# Patient Record
Sex: MALE | Race: BLACK OR AFRICAN AMERICAN | NOT HISPANIC OR LATINO | Employment: OTHER | URBAN - METROPOLITAN AREA
[De-identification: names, ages, dates, MRNs, and addresses within clinical notes are randomized per-mention and may not be internally consistent; named-entity substitution may affect disease eponyms.]

---

## 2023-04-26 LAB
COBALAMIN (VITAMIN B12) (PG/ML) IN SER/PLAS: 322 PG/ML (ref 211–911)
FOLATE (NG/ML) IN SER/PLAS: 9.5 NG/ML
HEPATITIS B VIRUS SURFACE AB (MIU/ML) IN SERUM: 318.3 MIU/ML
HEPATITIS B VIRUS SURFACE AG PRESENCE IN SERUM: NONREACTIVE
IRON (UG/DL) IN SER/PLAS: 104 UG/DL (ref 35–150)
IRON BINDING CAPACITY (UG/DL) IN SER/PLAS: 409 UG/DL (ref 240–445)
IRON SATURATION (%) IN SER/PLAS: 25 % (ref 25–45)
THYROTROPIN (MIU/L) IN SER/PLAS BY DETECTION LIMIT <= 0.05 MIU/L: 0.99 MIU/L (ref 0.44–3.98)

## 2023-04-27 LAB
FERRITIN (UG/LL) IN SER/PLAS: 51 UG/L (ref 20–300)
HEPATITIS B VIRUS CORE AB (PRESENCE) IN SER/PLAS BY IMM: NONREACTIVE
HEPATITIS B VIRUS CORE IGM AB PRESENCE IN SER/PLAS BY IMMUNOASSY: NONREACTIVE
HEPATITIS C VIRUS AB PRESENCE IN SERUM: NONREACTIVE

## 2023-05-10 ENCOUNTER — HOSPITAL ENCOUNTER (OUTPATIENT)
Dept: DATA CONVERSION | Facility: HOSPITAL | Age: 63
End: 2023-05-10
Attending: STUDENT IN AN ORGANIZED HEALTH CARE EDUCATION/TRAINING PROGRAM | Admitting: STUDENT IN AN ORGANIZED HEALTH CARE EDUCATION/TRAINING PROGRAM
Payer: COMMERCIAL

## 2023-05-10 DIAGNOSIS — Z12.11 ENCOUNTER FOR SCREENING FOR MALIGNANT NEOPLASM OF COLON: ICD-10-CM

## 2023-05-10 DIAGNOSIS — Z79.84 LONG TERM (CURRENT) USE OF ORAL HYPOGLYCEMIC DRUGS: ICD-10-CM

## 2023-05-10 DIAGNOSIS — K64.4 RESIDUAL HEMORRHOIDAL SKIN TAGS: ICD-10-CM

## 2023-05-10 DIAGNOSIS — B37.0 CANDIDAL STOMATITIS: ICD-10-CM

## 2023-05-10 DIAGNOSIS — K63.5 POLYP OF COLON: ICD-10-CM

## 2023-05-10 DIAGNOSIS — K40.30 UNILATERAL INGUINAL HERNIA, WITH OBSTRUCTION, WITHOUT GANGRENE, NOT SPECIFIED AS RECURRENT: ICD-10-CM

## 2023-05-10 DIAGNOSIS — Z79.82 LONG TERM (CURRENT) USE OF ASPIRIN: ICD-10-CM

## 2023-05-10 DIAGNOSIS — I42.8 OTHER CARDIOMYOPATHIES (MULTI): ICD-10-CM

## 2023-05-10 DIAGNOSIS — I25.10 ATHEROSCLEROTIC HEART DISEASE OF NATIVE CORONARY ARTERY WITHOUT ANGINA PECTORIS: ICD-10-CM

## 2023-05-10 DIAGNOSIS — Q39.8 OTHER CONGENITAL MALFORMATIONS OF ESOPHAGUS: ICD-10-CM

## 2023-05-10 DIAGNOSIS — K62.1 RECTAL POLYP: ICD-10-CM

## 2023-05-10 DIAGNOSIS — K29.70 GASTRITIS, UNSPECIFIED, WITHOUT BLEEDING: ICD-10-CM

## 2023-05-10 DIAGNOSIS — Q43.8 OTHER SPECIFIED CONGENITAL MALFORMATIONS OF INTESTINE: ICD-10-CM

## 2023-05-10 DIAGNOSIS — K57.30 DIVERTICULOSIS OF LARGE INTESTINE WITHOUT PERFORATION OR ABSCESS WITHOUT BLEEDING: ICD-10-CM

## 2023-05-10 DIAGNOSIS — K64.8 OTHER HEMORRHOIDS: ICD-10-CM

## 2023-05-10 DIAGNOSIS — I25.2 OLD MYOCARDIAL INFARCTION: ICD-10-CM

## 2023-05-10 DIAGNOSIS — E11.9 TYPE 2 DIABETES MELLITUS WITHOUT COMPLICATIONS (MULTI): ICD-10-CM

## 2023-05-10 DIAGNOSIS — K44.9 DIAPHRAGMATIC HERNIA WITHOUT OBSTRUCTION OR GANGRENE: ICD-10-CM

## 2023-05-10 DIAGNOSIS — F20.9 SCHIZOPHRENIA, UNSPECIFIED (MULTI): ICD-10-CM

## 2023-05-10 DIAGNOSIS — R77.8 OTHER SPECIFIED ABNORMALITIES OF PLASMA PROTEINS: ICD-10-CM

## 2023-05-10 DIAGNOSIS — R12 HEARTBURN: ICD-10-CM

## 2023-05-10 DIAGNOSIS — J44.9 CHRONIC OBSTRUCTIVE PULMONARY DISEASE, UNSPECIFIED (MULTI): ICD-10-CM

## 2023-05-10 LAB
POCT GLUCOSE: 109 MG/DL (ref 74–99)
POCT GLUCOSE: 94 MG/DL (ref 74–99)

## 2023-05-17 LAB
COMPLETE PATHOLOGY REPORT: NORMAL
CONVERTED CLINICAL DIAGNOSIS-HISTORY: NORMAL
CONVERTED FINAL DIAGNOSIS: NORMAL
CONVERTED FINAL REPORT PDF LINK TO COPY AND PASTE: NORMAL
CONVERTED GROSS DESCRIPTION: NORMAL

## 2023-06-20 LAB — HIV 1/ 2 AG/AB SCREEN: NONREACTIVE

## 2023-09-07 VITALS — HEIGHT: 68 IN | WEIGHT: 182.98 LBS | BODY MASS INDEX: 27.73 KG/M2

## 2023-11-10 DIAGNOSIS — B37.0 THRUSH: Primary | ICD-10-CM

## 2023-11-11 RX ORDER — NYSTATIN 100000 [USP'U]/ML
SUSPENSION ORAL
Qty: 200 ML | Refills: 10 | Status: SHIPPED | OUTPATIENT
Start: 2023-11-11

## 2023-12-29 VITALS
TEMPERATURE: 97.7 F | BODY MASS INDEX: 27.28 KG/M2 | HEIGHT: 68 IN | SYSTOLIC BLOOD PRESSURE: 166 MMHG | RESPIRATION RATE: 20 BRPM | OXYGEN SATURATION: 93 % | HEART RATE: 111 BPM | DIASTOLIC BLOOD PRESSURE: 110 MMHG | WEIGHT: 180 LBS

## 2023-12-29 PROCEDURE — 99283 EMERGENCY DEPT VISIT LOW MDM: CPT | Performed by: EMERGENCY MEDICINE

## 2023-12-29 ASSESSMENT — COLUMBIA-SUICIDE SEVERITY RATING SCALE - C-SSRS
6. HAVE YOU EVER DONE ANYTHING, STARTED TO DO ANYTHING, OR PREPARED TO DO ANYTHING TO END YOUR LIFE?: NO
2. HAVE YOU ACTUALLY HAD ANY THOUGHTS OF KILLING YOURSELF?: NO
1. IN THE PAST MONTH, HAVE YOU WISHED YOU WERE DEAD OR WISHED YOU COULD GO TO SLEEP AND NOT WAKE UP?: NO

## 2023-12-30 ENCOUNTER — HOSPITAL ENCOUNTER (EMERGENCY)
Facility: HOSPITAL | Age: 63
Discharge: HOME | End: 2023-12-30
Attending: EMERGENCY MEDICINE
Payer: COMMERCIAL

## 2023-12-30 DIAGNOSIS — G89.29 CHRONIC BILATERAL LOW BACK PAIN WITHOUT SCIATICA: Primary | ICD-10-CM

## 2023-12-30 DIAGNOSIS — M54.50 CHRONIC BILATERAL LOW BACK PAIN WITHOUT SCIATICA: Primary | ICD-10-CM

## 2023-12-30 PROCEDURE — 2500000001 HC RX 250 WO HCPCS SELF ADMINISTERED DRUGS (ALT 637 FOR MEDICARE OP): Performed by: EMERGENCY MEDICINE

## 2023-12-30 RX ORDER — IBUPROFEN 600 MG/1
600 TABLET ORAL EVERY 6 HOURS PRN
Qty: 28 TABLET | Refills: 0 | Status: SHIPPED | OUTPATIENT
Start: 2023-12-30 | End: 2024-01-06

## 2023-12-30 RX ORDER — CYCLOBENZAPRINE HCL 10 MG
10 TABLET ORAL 2 TIMES DAILY PRN
Qty: 10 TABLET | Refills: 0 | Status: SHIPPED | OUTPATIENT
Start: 2023-12-30 | End: 2024-05-17 | Stop reason: HOSPADM

## 2023-12-30 RX ORDER — IBUPROFEN 600 MG/1
600 TABLET ORAL ONCE
Status: COMPLETED | OUTPATIENT
Start: 2023-12-30 | End: 2023-12-30

## 2023-12-30 RX ADMIN — IBUPROFEN 600 MG: 600 TABLET, FILM COATED ORAL at 04:07
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
## 2024-03-20 DIAGNOSIS — R12 HEARTBURN: Primary | ICD-10-CM

## 2024-03-21 RX ORDER — OMEPRAZOLE 40 MG/1
40 CAPSULE, DELAYED RELEASE ORAL DAILY
Qty: 30 CAPSULE | Refills: 10 | Status: SHIPPED | OUTPATIENT
Start: 2024-03-21

## 2024-04-18 DIAGNOSIS — Z12.11 COLON CANCER SCREENING: Primary | ICD-10-CM

## 2024-04-18 RX ORDER — POLYETHYLENE GLYCOL 3350 17 G/17G
POWDER, FOR SOLUTION ORAL
Qty: 510 G | Refills: 10 | Status: SHIPPED | OUTPATIENT
Start: 2024-04-18

## 2024-05-14 ENCOUNTER — APPOINTMENT (OUTPATIENT)
Dept: CARDIOLOGY | Facility: HOSPITAL | Age: 64
DRG: 193 | End: 2024-05-14
Payer: COMMERCIAL

## 2024-05-14 ENCOUNTER — APPOINTMENT (OUTPATIENT)
Dept: RADIOLOGY | Facility: HOSPITAL | Age: 64
DRG: 193 | End: 2024-05-14
Payer: COMMERCIAL

## 2024-05-14 ENCOUNTER — HOSPITAL ENCOUNTER (INPATIENT)
Facility: HOSPITAL | Age: 64
LOS: 3 days | Discharge: HOME | DRG: 193 | End: 2024-05-17
Attending: EMERGENCY MEDICINE | Admitting: STUDENT IN AN ORGANIZED HEALTH CARE EDUCATION/TRAINING PROGRAM
Payer: COMMERCIAL

## 2024-05-14 DIAGNOSIS — R79.89 OTHER SPECIFIED ABNORMAL FINDINGS OF BLOOD CHEMISTRY: ICD-10-CM

## 2024-05-14 DIAGNOSIS — J44.1 COPD EXACERBATION (MULTI): Primary | ICD-10-CM

## 2024-05-14 DIAGNOSIS — J18.9 PNEUMONIA OF RIGHT LOWER LOBE DUE TO INFECTIOUS ORGANISM: ICD-10-CM

## 2024-05-14 DIAGNOSIS — I50.20 UNSPECIFIED SYSTOLIC (CONGESTIVE) HEART FAILURE (MULTI): ICD-10-CM

## 2024-05-14 DIAGNOSIS — J96.02 ACUTE RESPIRATORY FAILURE WITH HYPOXIA AND HYPERCAPNIA (MULTI): ICD-10-CM

## 2024-05-14 DIAGNOSIS — I50.9 HEART FAILURE, UNSPECIFIED HF CHRONICITY, UNSPECIFIED HEART FAILURE TYPE (MULTI): ICD-10-CM

## 2024-05-14 DIAGNOSIS — J96.01 ACUTE RESPIRATORY FAILURE WITH HYPOXIA AND HYPERCAPNIA (MULTI): ICD-10-CM

## 2024-05-14 LAB
ALBUMIN SERPL BCP-MCNC: 4.5 G/DL (ref 3.4–5)
ALP SERPL-CCNC: 53 U/L (ref 33–136)
ALT SERPL W P-5'-P-CCNC: 12 U/L (ref 10–52)
ANION GAP BLDV CALCULATED.4IONS-SCNC: 10 MMOL/L (ref 10–25)
ANION GAP BLDV CALCULATED.4IONS-SCNC: 13 MMOL/L (ref 10–25)
ANION GAP SERPL CALC-SCNC: 14 MMOL/L (ref 10–20)
AST SERPL W P-5'-P-CCNC: 13 U/L (ref 9–39)
BASE EXCESS BLDV CALC-SCNC: -0.1 MMOL/L (ref -2–3)
BASE EXCESS BLDV CALC-SCNC: -1.2 MMOL/L (ref -2–3)
BASOPHILS # BLD AUTO: 0.08 X10*3/UL (ref 0–0.1)
BASOPHILS NFR BLD AUTO: 0.6 %
BILIRUB SERPL-MCNC: 0.4 MG/DL (ref 0–1.2)
BNP SERPL-MCNC: 452 PG/ML (ref 0–99)
BODY TEMPERATURE: 37 DEGREES CELSIUS
BODY TEMPERATURE: 37 DEGREES CELSIUS
BUN SERPL-MCNC: 9 MG/DL (ref 6–23)
CA-I BLDV-SCNC: 1.18 MMOL/L (ref 1.1–1.33)
CA-I BLDV-SCNC: 1.29 MMOL/L (ref 1.1–1.33)
CALCIUM SERPL-MCNC: 9.5 MG/DL (ref 8.6–10.3)
CARDIAC TROPONIN I PNL SERPL HS: 49 NG/L (ref 0–20)
CARDIAC TROPONIN I PNL SERPL HS: 55 NG/L (ref 0–20)
CHLORIDE BLDV-SCNC: 94 MMOL/L (ref 98–107)
CHLORIDE BLDV-SCNC: 94 MMOL/L (ref 98–107)
CHLORIDE SERPL-SCNC: 95 MMOL/L (ref 98–107)
CO2 SERPL-SCNC: 26 MMOL/L (ref 21–32)
CREAT SERPL-MCNC: 0.83 MG/DL (ref 0.5–1.3)
CRITICAL CALL TIME: 112
CRITICAL CALLED BY: ABNORMAL
CRITICAL CALLED TO: ABNORMAL
CRITICAL READ BACK: ABNORMAL
EGFRCR SERPLBLD CKD-EPI 2021: >90 ML/MIN/1.73M*2
EOSINOPHIL # BLD AUTO: 0.57 X10*3/UL (ref 0–0.7)
EOSINOPHIL NFR BLD AUTO: 4.1 %
EPAP CMH2O: 8 CM H2O
ERYTHROCYTE [DISTWIDTH] IN BLOOD BY AUTOMATED COUNT: 15.6 % (ref 11.5–14.5)
FREQUENCY (BPM): 20 BPM
GLUCOSE BLD MANUAL STRIP-MCNC: 135 MG/DL (ref 74–99)
GLUCOSE BLD MANUAL STRIP-MCNC: 140 MG/DL (ref 74–99)
GLUCOSE BLD MANUAL STRIP-MCNC: 156 MG/DL (ref 74–99)
GLUCOSE BLD MANUAL STRIP-MCNC: 161 MG/DL (ref 74–99)
GLUCOSE BLDV-MCNC: 256 MG/DL (ref 74–99)
GLUCOSE BLDV-MCNC: 287 MG/DL (ref 74–99)
GLUCOSE SERPL-MCNC: 257 MG/DL (ref 74–99)
HCO3 BLDV-SCNC: 26.3 MMOL/L (ref 22–26)
HCO3 BLDV-SCNC: 30.6 MMOL/L (ref 22–26)
HCT VFR BLD AUTO: 42.8 % (ref 41–52)
HCT VFR BLD EST: 35 % (ref 41–52)
HCT VFR BLD EST: 41 % (ref 41–52)
HGB BLD-MCNC: 13.7 G/DL (ref 13.5–17.5)
HGB BLDV-MCNC: 11.8 G/DL (ref 13.5–17.5)
HGB BLDV-MCNC: 13.6 G/DL (ref 13.5–17.5)
IMM GRANULOCYTES # BLD AUTO: 0.06 X10*3/UL (ref 0–0.7)
IMM GRANULOCYTES NFR BLD AUTO: 0.4 % (ref 0–0.9)
INHALED O2 CONCENTRATION: 44 %
INHALED O2 CONCENTRATION: 50 %
IPAP CMH2O: 20 CM H2O
LACTATE BLDV-SCNC: 2.5 MMOL/L (ref 0.4–2)
LACTATE BLDV-SCNC: 3.1 MMOL/L (ref 0.4–2)
LYMPHOCYTES # BLD AUTO: 4.59 X10*3/UL (ref 1.2–4.8)
LYMPHOCYTES NFR BLD AUTO: 32.9 %
MCH RBC QN AUTO: 31.6 PG (ref 26–34)
MCHC RBC AUTO-ENTMCNC: 32 G/DL (ref 32–36)
MCV RBC AUTO: 99 FL (ref 80–100)
MONOCYTES # BLD AUTO: 1.26 X10*3/UL (ref 0.1–1)
MONOCYTES NFR BLD AUTO: 9 %
NEUTROPHILS # BLD AUTO: 7.39 X10*3/UL (ref 1.2–7.7)
NEUTROPHILS NFR BLD AUTO: 53 %
NRBC BLD-RTO: 0 /100 WBCS (ref 0–0)
OXYHGB MFR BLDV: 63.7 % (ref 45–75)
OXYHGB MFR BLDV: 77.6 % (ref 45–75)
PCO2 BLDV: 56 MM HG (ref 41–51)
PCO2 BLDV: 82 MM HG (ref 41–51)
PH BLDV: 7.18 PH (ref 7.33–7.43)
PH BLDV: 7.28 PH (ref 7.33–7.43)
PLATELET # BLD AUTO: 221 X10*3/UL (ref 150–450)
PO2 BLDV: 43 MM HG (ref 35–45)
PO2 BLDV: 54 MM HG (ref 35–45)
POTASSIUM BLDV-SCNC: 4.8 MMOL/L (ref 3.5–5.3)
POTASSIUM BLDV-SCNC: 5 MMOL/L (ref 3.5–5.3)
POTASSIUM SERPL-SCNC: 4.6 MMOL/L (ref 3.5–5.3)
PROCALCITONIN SERPL-MCNC: 0.06 NG/ML
PROT SERPL-MCNC: 7.8 G/DL (ref 6.4–8.2)
RBC # BLD AUTO: 4.33 X10*6/UL (ref 4.5–5.9)
SAO2 % BLDV: 67 % (ref 45–75)
SAO2 % BLDV: 81 % (ref 45–75)
SARS-COV-2 RNA RESP QL NAA+PROBE: NOT DETECTED
SODIUM BLDV-SCNC: 128 MMOL/L (ref 136–145)
SODIUM BLDV-SCNC: 130 MMOL/L (ref 136–145)
SODIUM SERPL-SCNC: 130 MMOL/L (ref 136–145)
SPONTANEOUS TIDAL VOLUME: 818 ML
TOTAL MINUTE VOLUME: 17.1 LITER
VENTILATOR MODE: ABNORMAL
VENTILATOR RATE: 14 BPM
WBC # BLD AUTO: 14 X10*3/UL (ref 4.4–11.3)

## 2024-05-14 PROCEDURE — 2500000004 HC RX 250 GENERAL PHARMACY W/ HCPCS (ALT 636 FOR OP/ED)

## 2024-05-14 PROCEDURE — 94640 AIRWAY INHALATION TREATMENT: CPT

## 2024-05-14 PROCEDURE — 82947 ASSAY GLUCOSE BLOOD QUANT: CPT

## 2024-05-14 PROCEDURE — 71045 X-RAY EXAM CHEST 1 VIEW: CPT

## 2024-05-14 PROCEDURE — 71275 CT ANGIOGRAPHY CHEST: CPT | Performed by: RADIOLOGY

## 2024-05-14 PROCEDURE — 2500000002 HC RX 250 W HCPCS SELF ADMINISTERED DRUGS (ALT 637 FOR MEDICARE OP, ALT 636 FOR OP/ED)

## 2024-05-14 PROCEDURE — 84132 ASSAY OF SERUM POTASSIUM: CPT | Mod: 91 | Performed by: EMERGENCY MEDICINE

## 2024-05-14 PROCEDURE — 84484 ASSAY OF TROPONIN QUANT: CPT | Performed by: EMERGENCY MEDICINE

## 2024-05-14 PROCEDURE — 2500000001 HC RX 250 WO HCPCS SELF ADMINISTERED DRUGS (ALT 637 FOR MEDICARE OP)

## 2024-05-14 PROCEDURE — 87899 AGENT NOS ASSAY W/OPTIC: CPT | Mod: PARLAB

## 2024-05-14 PROCEDURE — 96365 THER/PROPH/DIAG IV INF INIT: CPT

## 2024-05-14 PROCEDURE — 2500000005 HC RX 250 GENERAL PHARMACY W/O HCPCS: Performed by: EMERGENCY MEDICINE

## 2024-05-14 PROCEDURE — 93005 ELECTROCARDIOGRAM TRACING: CPT

## 2024-05-14 PROCEDURE — 36415 COLL VENOUS BLD VENIPUNCTURE: CPT | Performed by: EMERGENCY MEDICINE

## 2024-05-14 PROCEDURE — 87449 NOS EACH ORGANISM AG IA: CPT | Mod: PARLAB

## 2024-05-14 PROCEDURE — 5A09357 ASSISTANCE WITH RESPIRATORY VENTILATION, LESS THAN 24 CONSECUTIVE HOURS, CONTINUOUS POSITIVE AIRWAY PRESSURE: ICD-10-PCS | Performed by: EMERGENCY MEDICINE

## 2024-05-14 PROCEDURE — C9113 INJ PANTOPRAZOLE SODIUM, VIA: HCPCS

## 2024-05-14 PROCEDURE — 99223 1ST HOSP IP/OBS HIGH 75: CPT

## 2024-05-14 PROCEDURE — 83880 ASSAY OF NATRIURETIC PEPTIDE: CPT | Performed by: EMERGENCY MEDICINE

## 2024-05-14 PROCEDURE — 87040 BLOOD CULTURE FOR BACTERIA: CPT | Mod: PARLAB | Performed by: EMERGENCY MEDICINE

## 2024-05-14 PROCEDURE — 85025 COMPLETE CBC W/AUTO DIFF WBC: CPT | Performed by: EMERGENCY MEDICINE

## 2024-05-14 PROCEDURE — 87081 CULTURE SCREEN ONLY: CPT | Mod: PARLAB

## 2024-05-14 PROCEDURE — 2500000002 HC RX 250 W HCPCS SELF ADMINISTERED DRUGS (ALT 637 FOR MEDICARE OP, ALT 636 FOR OP/ED): Mod: MUE | Performed by: STUDENT IN AN ORGANIZED HEALTH CARE EDUCATION/TRAINING PROGRAM

## 2024-05-14 PROCEDURE — 84145 PROCALCITONIN (PCT): CPT | Mod: PARLAB

## 2024-05-14 PROCEDURE — 94660 CPAP INITIATION&MGMT: CPT

## 2024-05-14 PROCEDURE — 86738 MYCOPLASMA ANTIBODY: CPT

## 2024-05-14 PROCEDURE — 2500000004 HC RX 250 GENERAL PHARMACY W/ HCPCS (ALT 636 FOR OP/ED): Performed by: EMERGENCY MEDICINE

## 2024-05-14 PROCEDURE — 99291 CRITICAL CARE FIRST HOUR: CPT | Mod: 25 | Performed by: EMERGENCY MEDICINE

## 2024-05-14 PROCEDURE — 2500000002 HC RX 250 W HCPCS SELF ADMINISTERED DRUGS (ALT 637 FOR MEDICARE OP, ALT 636 FOR OP/ED): Performed by: EMERGENCY MEDICINE

## 2024-05-14 PROCEDURE — 87635 SARS-COV-2 COVID-19 AMP PRB: CPT | Performed by: EMERGENCY MEDICINE

## 2024-05-14 PROCEDURE — 2550000001 HC RX 255 CONTRASTS: Performed by: STUDENT IN AN ORGANIZED HEALTH CARE EDUCATION/TRAINING PROGRAM

## 2024-05-14 PROCEDURE — 71045 X-RAY EXAM CHEST 1 VIEW: CPT | Mod: FOREIGN READ | Performed by: RADIOLOGY

## 2024-05-14 PROCEDURE — 2500000004 HC RX 250 GENERAL PHARMACY W/ HCPCS (ALT 636 FOR OP/ED): Performed by: STUDENT IN AN ORGANIZED HEALTH CARE EDUCATION/TRAINING PROGRAM

## 2024-05-14 PROCEDURE — 71275 CT ANGIOGRAPHY CHEST: CPT

## 2024-05-14 PROCEDURE — 2060000001 HC INTERMEDIATE ICU ROOM DAILY

## 2024-05-14 RX ORDER — ASPIRIN 325 MG
50000 TABLET, DELAYED RELEASE (ENTERIC COATED) ORAL
Status: DISCONTINUED | OUTPATIENT
Start: 2024-05-19 | End: 2024-05-14

## 2024-05-14 RX ORDER — IPRATROPIUM BROMIDE AND ALBUTEROL SULFATE 2.5; .5 MG/3ML; MG/3ML
3 SOLUTION RESPIRATORY (INHALATION) ONCE
Status: COMPLETED | OUTPATIENT
Start: 2024-05-14 | End: 2024-05-14

## 2024-05-14 RX ORDER — CEFTRIAXONE 1 G/50ML
1 INJECTION, SOLUTION INTRAVENOUS EVERY 24 HOURS
Status: DISCONTINUED | OUTPATIENT
Start: 2024-05-15 | End: 2024-05-14

## 2024-05-14 RX ORDER — INSULIN LISPRO 100 [IU]/ML
0-10 INJECTION, SOLUTION INTRAVENOUS; SUBCUTANEOUS
Status: DISCONTINUED | OUTPATIENT
Start: 2024-05-14 | End: 2024-05-17 | Stop reason: HOSPADM

## 2024-05-14 RX ORDER — IPRATROPIUM BROMIDE AND ALBUTEROL SULFATE 2.5; .5 MG/3ML; MG/3ML
SOLUTION RESPIRATORY (INHALATION)
Status: COMPLETED
Start: 2024-05-14 | End: 2024-05-14

## 2024-05-14 RX ORDER — BENZTROPINE MESYLATE 0.5 MG/1
0.5 TABLET ORAL 2 TIMES DAILY
Status: DISCONTINUED | OUTPATIENT
Start: 2024-05-14 | End: 2024-05-17 | Stop reason: HOSPADM

## 2024-05-14 RX ORDER — METFORMIN HYDROCHLORIDE 500 MG/1
500 TABLET ORAL 2 TIMES DAILY
COMMUNITY

## 2024-05-14 RX ORDER — FERROUS SULFATE 325(65) MG
325 TABLET ORAL EVERY OTHER DAY
COMMUNITY

## 2024-05-14 RX ORDER — DOCUSATE SODIUM 100 MG/1
1 CAPSULE, LIQUID FILLED ORAL 2 TIMES DAILY
COMMUNITY

## 2024-05-14 RX ORDER — CARVEDILOL 25 MG/1
25 TABLET ORAL 2 TIMES DAILY
Status: DISCONTINUED | OUTPATIENT
Start: 2024-05-14 | End: 2024-05-17 | Stop reason: HOSPADM

## 2024-05-14 RX ORDER — ENOXAPARIN SODIUM 100 MG/ML
40 INJECTION SUBCUTANEOUS EVERY 24 HOURS
Status: DISCONTINUED | OUTPATIENT
Start: 2024-05-14 | End: 2024-05-17 | Stop reason: HOSPADM

## 2024-05-14 RX ORDER — FLUTICASONE PROPIONATE 50 MCG
1 SPRAY, SUSPENSION (ML) NASAL DAILY PRN
COMMUNITY

## 2024-05-14 RX ORDER — CARVEDILOL 25 MG/1
1 TABLET ORAL 2 TIMES DAILY
COMMUNITY

## 2024-05-14 RX ORDER — FUROSEMIDE 10 MG/ML
40 INJECTION INTRAMUSCULAR; INTRAVENOUS
Status: COMPLETED | OUTPATIENT
Start: 2024-05-15 | End: 2024-05-15

## 2024-05-14 RX ORDER — ACETAZOLAMIDE 500 MG/5ML
500 INJECTION, POWDER, LYOPHILIZED, FOR SOLUTION INTRAVENOUS ONCE
Status: COMPLETED | OUTPATIENT
Start: 2024-05-15 | End: 2024-05-15

## 2024-05-14 RX ORDER — AMMONIUM LACTATE 12 G/100G
1 LOTION TOPICAL 2 TIMES DAILY PRN
COMMUNITY

## 2024-05-14 RX ORDER — SPIRONOLACTONE 25 MG/1
25 TABLET ORAL 2 TIMES DAILY
Status: DISCONTINUED | OUTPATIENT
Start: 2024-05-14 | End: 2024-05-15

## 2024-05-14 RX ORDER — POLYETHYLENE GLYCOL 3350 17 G/17G
17 POWDER, FOR SOLUTION ORAL DAILY
Status: DISCONTINUED | OUTPATIENT
Start: 2024-05-14 | End: 2024-05-17 | Stop reason: HOSPADM

## 2024-05-14 RX ORDER — ALBUTEROL SULFATE 0.83 MG/ML
2.5 SOLUTION RESPIRATORY (INHALATION) EVERY 4 HOURS PRN
Status: DISCONTINUED | OUTPATIENT
Start: 2024-05-14 | End: 2024-05-17 | Stop reason: HOSPADM

## 2024-05-14 RX ORDER — LANOLIN ALCOHOL/MO/W.PET/CERES
1000 CREAM (GRAM) TOPICAL DAILY
Status: DISCONTINUED | OUTPATIENT
Start: 2024-05-14 | End: 2024-05-14

## 2024-05-14 RX ORDER — PANTOPRAZOLE SODIUM 40 MG/10ML
40 INJECTION, POWDER, LYOPHILIZED, FOR SOLUTION INTRAVENOUS DAILY
Status: DISCONTINUED | OUTPATIENT
Start: 2024-05-14 | End: 2024-05-17 | Stop reason: HOSPADM

## 2024-05-14 RX ORDER — IPRATROPIUM BROMIDE AND ALBUTEROL SULFATE 2.5; .5 MG/3ML; MG/3ML
3 SOLUTION RESPIRATORY (INHALATION) EVERY 2 HOUR PRN
Status: DISCONTINUED | OUTPATIENT
Start: 2024-05-14 | End: 2024-05-14

## 2024-05-14 RX ORDER — BENZTROPINE MESYLATE 0.5 MG/1
0.5 TABLET ORAL 2 TIMES DAILY
COMMUNITY

## 2024-05-14 RX ORDER — ASPIRIN 81 MG/1
81 TABLET ORAL DAILY
COMMUNITY

## 2024-05-14 RX ORDER — LANOLIN ALCOHOL/MO/W.PET/CERES
1000 CREAM (GRAM) TOPICAL DAILY
COMMUNITY

## 2024-05-14 RX ORDER — CETIRIZINE HYDROCHLORIDE 10 MG/1
1 TABLET ORAL DAILY
COMMUNITY

## 2024-05-14 RX ORDER — HYDROCORTISONE 25 MG/ML
1 LOTION TOPICAL 2 TIMES DAILY PRN
COMMUNITY

## 2024-05-14 RX ORDER — ATORVASTATIN CALCIUM 40 MG/1
1 TABLET, FILM COATED ORAL DAILY
COMMUNITY

## 2024-05-14 RX ORDER — DEXTROSE 50 % IN WATER (D50W) INTRAVENOUS SYRINGE
25
Status: DISCONTINUED | OUTPATIENT
Start: 2024-05-14 | End: 2024-05-17 | Stop reason: HOSPADM

## 2024-05-14 RX ORDER — GUAIFENESIN 600 MG/1
600 TABLET, EXTENDED RELEASE ORAL 2 TIMES DAILY PRN
Status: DISCONTINUED | OUTPATIENT
Start: 2024-05-14 | End: 2024-05-17 | Stop reason: HOSPADM

## 2024-05-14 RX ORDER — AMLODIPINE BESYLATE 10 MG/1
10 TABLET ORAL DAILY
COMMUNITY

## 2024-05-14 RX ORDER — CEFTRIAXONE 2 G/50ML
2 INJECTION, SOLUTION INTRAVENOUS ONCE
Status: COMPLETED | OUTPATIENT
Start: 2024-05-14 | End: 2024-05-14

## 2024-05-14 RX ORDER — FUROSEMIDE 10 MG/ML
40 INJECTION INTRAMUSCULAR; INTRAVENOUS ONCE
Status: COMPLETED | OUTPATIENT
Start: 2024-05-14 | End: 2024-05-14

## 2024-05-14 RX ORDER — ATORVASTATIN CALCIUM 40 MG/1
40 TABLET, FILM COATED ORAL DAILY
Status: DISCONTINUED | OUTPATIENT
Start: 2024-05-14 | End: 2024-05-17 | Stop reason: HOSPADM

## 2024-05-14 RX ORDER — ASPIRIN 325 MG
1 TABLET, DELAYED RELEASE (ENTERIC COATED) ORAL
COMMUNITY

## 2024-05-14 RX ORDER — IPRATROPIUM BROMIDE AND ALBUTEROL SULFATE 2.5; .5 MG/3ML; MG/3ML
3 SOLUTION RESPIRATORY (INHALATION)
Status: DISCONTINUED | OUTPATIENT
Start: 2024-05-14 | End: 2024-05-14

## 2024-05-14 RX ORDER — SPIRONOLACTONE 25 MG/1
25 TABLET ORAL 2 TIMES DAILY
COMMUNITY

## 2024-05-14 RX ORDER — DEXTROSE 50 % IN WATER (D50W) INTRAVENOUS SYRINGE
12.5
Status: DISCONTINUED | OUTPATIENT
Start: 2024-05-14 | End: 2024-05-17 | Stop reason: HOSPADM

## 2024-05-14 RX ORDER — AMLODIPINE BESYLATE 10 MG/1
10 TABLET ORAL DAILY
Status: DISCONTINUED | OUTPATIENT
Start: 2024-05-14 | End: 2024-05-16

## 2024-05-14 RX ORDER — TRIAMCINOLONE ACETONIDE 1 MG/G
1 OINTMENT TOPICAL 2 TIMES DAILY PRN
COMMUNITY

## 2024-05-14 RX ORDER — ASPIRIN 81 MG/1
81 TABLET ORAL DAILY
Status: DISCONTINUED | OUTPATIENT
Start: 2024-05-14 | End: 2024-05-17 | Stop reason: HOSPADM

## 2024-05-14 RX ADMIN — IPRATROPIUM BROMIDE AND ALBUTEROL SULFATE 3 ML: .5; 3 SOLUTION RESPIRATORY (INHALATION) at 10:34

## 2024-05-14 RX ADMIN — BENZTROPINE MESYLATE 0.5 MG: 0.5 TABLET ORAL at 22:12

## 2024-05-14 RX ADMIN — IPRATROPIUM BROMIDE AND ALBUTEROL SULFATE 3 ML: .5; 3 SOLUTION RESPIRATORY (INHALATION) at 14:05

## 2024-05-14 RX ADMIN — ASPIRIN 81 MG: 81 TABLET, COATED ORAL at 10:39

## 2024-05-14 RX ADMIN — ATORVASTATIN CALCIUM 40 MG: 40 TABLET, FILM COATED ORAL at 10:39

## 2024-05-14 RX ADMIN — IPRATROPIUM BROMIDE AND ALBUTEROL SULFATE 3 ML: 2.5; .5 SOLUTION RESPIRATORY (INHALATION) at 10:34

## 2024-05-14 RX ADMIN — ACETAZOLAMIDE 500 MG: 500 INJECTION, POWDER, LYOPHILIZED, FOR SOLUTION INTRAVENOUS at 21:28

## 2024-05-14 RX ADMIN — IOHEXOL 75 ML: 350 INJECTION, SOLUTION INTRAVENOUS at 19:05

## 2024-05-14 RX ADMIN — Medication 40 PERCENT: at 07:42

## 2024-05-14 RX ADMIN — CARVEDILOL 25 MG: 25 TABLET, FILM COATED ORAL at 21:28

## 2024-05-14 RX ADMIN — BENZTROPINE MESYLATE 0.5 MG: 0.5 TABLET ORAL at 11:59

## 2024-05-14 RX ADMIN — CARVEDILOL 25 MG: 25 TABLET, FILM COATED ORAL at 11:59

## 2024-05-14 RX ADMIN — PANTOPRAZOLE SODIUM 40 MG: 40 INJECTION, POWDER, FOR SOLUTION INTRAVENOUS at 08:54

## 2024-05-14 RX ADMIN — FUROSEMIDE 40 MG: 10 INJECTION, SOLUTION INTRAMUSCULAR; INTRAVENOUS at 21:27

## 2024-05-14 RX ADMIN — CEFTRIAXONE SODIUM 2 G: 2 INJECTION, SOLUTION INTRAVENOUS at 01:45

## 2024-05-14 RX ADMIN — CYANOCOBALAMIN TAB 1000 MCG 1000 MCG: 1000 TAB at 11:59

## 2024-05-14 RX ADMIN — SODIUM CHLORIDE 1000 ML: 9 INJECTION, SOLUTION INTRAVENOUS at 01:46

## 2024-05-14 RX ADMIN — AZITHROMYCIN MONOHYDRATE 500 MG: 500 INJECTION, POWDER, LYOPHILIZED, FOR SOLUTION INTRAVENOUS at 02:03

## 2024-05-14 RX ADMIN — IPRATROPIUM BROMIDE AND ALBUTEROL SULFATE 3 ML: .5; 3 SOLUTION RESPIRATORY (INHALATION) at 01:11

## 2024-05-14 RX ADMIN — IPRATROPIUM BROMIDE AND ALBUTEROL SULFATE 3 ML: .5; 3 SOLUTION RESPIRATORY (INHALATION) at 19:50

## 2024-05-14 RX ADMIN — ENOXAPARIN SODIUM 40 MG: 40 INJECTION SUBCUTANEOUS at 08:55

## 2024-05-14 RX ADMIN — INSULIN LISPRO 2 UNITS: 100 INJECTION, SOLUTION INTRAVENOUS; SUBCUTANEOUS at 10:51

## 2024-05-14 RX ADMIN — METHYLPREDNISOLONE SODIUM SUCCINATE 40 MG: 40 INJECTION, POWDER, FOR SOLUTION INTRAMUSCULAR; INTRAVENOUS at 10:39

## 2024-05-14 SDOH — SOCIAL STABILITY: SOCIAL INSECURITY: DO YOU FEEL UNSAFE GOING BACK TO THE PLACE WHERE YOU ARE LIVING?: NO

## 2024-05-14 SDOH — SOCIAL STABILITY: SOCIAL INSECURITY: WERE YOU ABLE TO COMPLETE ALL THE BEHAVIORAL HEALTH SCREENINGS?: YES

## 2024-05-14 SDOH — SOCIAL STABILITY: SOCIAL INSECURITY: HAVE YOU HAD THOUGHTS OF HARMING ANYONE ELSE?: NO

## 2024-05-14 SDOH — SOCIAL STABILITY: SOCIAL INSECURITY: HAS ANYONE EVER THREATENED TO HURT YOUR FAMILY OR YOUR PETS?: NO

## 2024-05-14 SDOH — SOCIAL STABILITY: SOCIAL INSECURITY: DO YOU FEEL ANYONE HAS EXPLOITED OR TAKEN ADVANTAGE OF YOU FINANCIALLY OR OF YOUR PERSONAL PROPERTY?: NO

## 2024-05-14 SDOH — SOCIAL STABILITY: SOCIAL INSECURITY: DOES ANYONE TRY TO KEEP YOU FROM HAVING/CONTACTING OTHER FRIENDS OR DOING THINGS OUTSIDE YOUR HOME?: NO

## 2024-05-14 SDOH — SOCIAL STABILITY: SOCIAL INSECURITY: ARE THERE ANY APPARENT SIGNS OF INJURIES/BEHAVIORS THAT COULD BE RELATED TO ABUSE/NEGLECT?: NO

## 2024-05-14 SDOH — SOCIAL STABILITY: SOCIAL INSECURITY: HAVE YOU HAD ANY THOUGHTS OF HARMING ANYONE ELSE?: NO

## 2024-05-14 SDOH — SOCIAL STABILITY: SOCIAL INSECURITY: ARE YOU OR HAVE YOU BEEN THREATENED OR ABUSED PHYSICALLY, EMOTIONALLY, OR SEXUALLY BY ANYONE?: NO

## 2024-05-14 SDOH — SOCIAL STABILITY: SOCIAL INSECURITY: ABUSE: ADULT

## 2024-05-14 ASSESSMENT — COGNITIVE AND FUNCTIONAL STATUS - GENERAL
HELP NEEDED FOR BATHING: A LITTLE
WALKING IN HOSPITAL ROOM: A LITTLE
CLIMB 3 TO 5 STEPS WITH RAILING: A LITTLE
PATIENT BASELINE BEDBOUND: NO
WALKING IN HOSPITAL ROOM: A LITTLE
CLIMB 3 TO 5 STEPS WITH RAILING: A LITTLE
DAILY ACTIVITIY SCORE: 24
MOBILITY SCORE: 22

## 2024-05-14 ASSESSMENT — PATIENT HEALTH QUESTIONNAIRE - PHQ9
1. LITTLE INTEREST OR PLEASURE IN DOING THINGS: NOT AT ALL
2. FEELING DOWN, DEPRESSED OR HOPELESS: NOT AT ALL
SUM OF ALL RESPONSES TO PHQ9 QUESTIONS 1 & 2: 0

## 2024-05-14 ASSESSMENT — PAIN SCALES - GENERAL
PAINLEVEL_OUTOF10: 0 - NO PAIN
PAINLEVEL_OUTOF10: 0 - NO PAIN

## 2024-05-14 ASSESSMENT — ACTIVITIES OF DAILY LIVING (ADL)
HEARING - LEFT EAR: FUNCTIONAL
JUDGMENT_ADEQUATE_SAFELY_COMPLETE_DAILY_ACTIVITIES: NO
WALKS IN HOME: NEEDS ASSISTANCE
LACK_OF_TRANSPORTATION: NO
HEARING - RIGHT EAR: FUNCTIONAL
TOILETING: INDEPENDENT
FEEDING YOURSELF: INDEPENDENT
LACK_OF_TRANSPORTATION: NO
ADEQUATE_TO_COMPLETE_ADL: YES
DRESSING YOURSELF: INDEPENDENT
GROOMING: INDEPENDENT
PATIENT'S MEMORY ADEQUATE TO SAFELY COMPLETE DAILY ACTIVITIES?: YES
BATHING: INDEPENDENT

## 2024-05-14 ASSESSMENT — LIFESTYLE VARIABLES
HOW OFTEN DO YOU HAVE 6 OR MORE DRINKS ON ONE OCCASION: NEVER
PRESCIPTION_ABUSE_PAST_12_MONTHS: NO
AUDIT-C TOTAL SCORE: 0
HOW MANY STANDARD DRINKS CONTAINING ALCOHOL DO YOU HAVE ON A TYPICAL DAY: PATIENT DOES NOT DRINK
HAVE PEOPLE ANNOYED YOU BY CRITICIZING YOUR DRINKING: NO
EVER FELT BAD OR GUILTY ABOUT YOUR DRINKING: NO
EVER HAD A DRINK FIRST THING IN THE MORNING TO STEADY YOUR NERVES TO GET RID OF A HANGOVER: NO
AUDIT-C TOTAL SCORE: 0
HAVE YOU EVER FELT YOU SHOULD CUT DOWN ON YOUR DRINKING: NO
SKIP TO QUESTIONS 9-10: 1
SUBSTANCE_ABUSE_PAST_12_MONTHS: NO
TOTAL SCORE: 0
HOW OFTEN DO YOU HAVE A DRINK CONTAINING ALCOHOL: NEVER

## 2024-05-14 ASSESSMENT — PAIN - FUNCTIONAL ASSESSMENT
PAIN_FUNCTIONAL_ASSESSMENT: 0-10
PAIN_FUNCTIONAL_ASSESSMENT: 0-10

## 2024-05-14 ASSESSMENT — COLUMBIA-SUICIDE SEVERITY RATING SCALE - C-SSRS
6. HAVE YOU EVER DONE ANYTHING, STARTED TO DO ANYTHING, OR PREPARED TO DO ANYTHING TO END YOUR LIFE?: NO
1. IN THE PAST MONTH, HAVE YOU WISHED YOU WERE DEAD OR WISHED YOU COULD GO TO SLEEP AND NOT WAKE UP?: NO
6. HAVE YOU EVER DONE ANYTHING, STARTED TO DO ANYTHING, OR PREPARED TO DO ANYTHING TO END YOUR LIFE?: NO
2. HAVE YOU ACTUALLY HAD ANY THOUGHTS OF KILLING YOURSELF?: NO
2. HAVE YOU ACTUALLY HAD ANY THOUGHTS OF KILLING YOURSELF?: NO
1. IN THE PAST MONTH, HAVE YOU WISHED YOU WERE DEAD OR WISHED YOU COULD GO TO SLEEP AND NOT WAKE UP?: NO

## 2024-05-14 ASSESSMENT — PAIN DESCRIPTION - PROGRESSION: CLINICAL_PROGRESSION: NOT CHANGED

## 2024-05-14 NOTE — H&P
History Of Present Illness  Cade Rader is a 64 y.o. male with a past medical history of GERD, tobacco use disorder, dysphagia, schizophrenia, hypertension, T2DM, chronic hyponatremia, anaphylaxis in 2022 (unknown etiology) who presents to [facility] from his group home for shortness of breath.  Patient reports that for the past few days he has had a cough, increased shortness of breath, increased wheezing and increased sputum production.  Denies any chest pain, lightheadedness, dizziness, nausea, vomiting, diarrhea, palpitations, abdominal pain.  In the ED, lactate was 3.1, VBG showed pH 7.18 CO2 82, WBC 14, , troponin 55 then 49.  Patient was given 1 L normal saline bolus, Rocephin and azithromycin.  Patient was initially altered however improved after BiPAP..  CXR showed right infrahilar and basilar airspace disease suggesting right lower lobe pneumonia.  Admitted to stepdown     Past Medical History  As above    Surgical History  He has no past surgical history on file.     Social History  Smokes 1 PPD, denies alcohol or drug use    Family History  No family history on file.     Allergies  Patient has no known allergies.    Review of Systems  10 point review of systems performed and negative except as stated above    Physical Exam  General:  Pleasant and cooperative. No apparent distress.  HEENT:  Normocephalic, atraumatic, mucus membranes moist.   Neck:  Trachea midline.  No JVD.    Chest: Wheezing bilaterally no rales, or rhonchi.  CV:  Regular rate and rhythm.  Positive S1/S2.   Abdomen: Bowel sounds present in all four quadrants, abdomen is soft, non-tender, non-distended.  Extremities:  No lower extremity edema or cyanosis.   Neurological:  AAOx3. No focal deficits.  Skin:  Warm and dry.    Last Recorded Vitals  /81   Pulse 87   Temp 35.6 °C (96.1 °F)   Resp 20   Wt 83.9 kg (185 lb)   SpO2 94%       Assessment/Plan   Principal Problem:    COPD exacerbation (Multi)  Patient is a 64-year-old  male with a past medical history of GERD, tobacco use disorder, dysphagia, schizophrenia, hypertension, T2DM, chronic hyponatremia who presents to [facility] from his group home for shortness of breath.  Patient reports that for the past few days he has had a cough, increased shortness of breath, increased wheezing and increased sputum production.    #Community-acquired pneumonia, RLL  #Acute hypercapnic hypoxic respiratory failure  #Leukocytosis  #Elevated troponin  -Continue Rocephin and azithromycin day 1  -MRSA swab pending  -Atypical urinary antigens ordered  -Follow-up blood cultures  -Elevated troponin likely demand ischemia from hypoxia    Chronic:  Hypertension-hold home amlodipine, carvedilol  G1JE-ncdy home metformin. SSI #2  GERD- protonix  Schizophrenia  Chronic constipation-continue MiraLAX  CAD-continue home aspirin, atorvastatin    DVT prophylaxis: Lovenox  CODE STATUS: DNR/DNI      Tiffany Silver DO

## 2024-05-14 NOTE — PROGRESS NOTES
Subjective   Cade Rader is a 64 y.o. male who presents with Shortness of Breath.    Patient seen this morning with no acute concerns.  Patient remains on BiPAP.  Will reach out to respiratory therapy about weaning him to nasal cannula.  Will be adding on Solu-Medrol as patient remains to be wheezing.    Objective   Vitals:    05/14/24 0830   BP: 145/80   Pulse: 93   Resp: 19   Temp:    SpO2: 98%      Physical Exam  Physical Exam:  Constitutional: well developed, awake, alert, no acute distress  ENMT: mucous membranes moist, EOMI, conjunctivae clear  Head/Neck: normocephalic, atraumatic; supple, trachea midline  Respiratory/Thorax: Increased work of breathing, bilateral wheezing  Cardiovascular: RRR, no murmur  Gastrointestinal: soft, nondistended, non-tender, bowel sounds appreciated  Extremities: palpable peripheral pulses, no edema or cyanosis  Neurological: AO x3, no focal deficits  Psychological: appropriate mood and behavior  Skin: warm and dry    Assessment/Plan       Cade Rader is a 64-year-old male with a past medical history of GERD, schizophrenia, hypertension, HFrEF EF 45%, T2DM, chronic hyponatremia and polysubstance use disorder who presented to [facility] from his group home with shortness of breath, admitted for COPD exacerbation and pneumonia.    Acute medical conditions:  #Suspect COPD exacerbation  #Community-acquired pneumonia  #Acute hypoxic respiratory failure  CXR showed right lower lobe infiltrate  -Continue Rocephin and azithromycin  -Ordered Solu-Medrol 40 mg IV twice daily  -On BiPAP continue to try and wean, SPO2 greater than 89%, patient on RA at baseline  -Pro-Oleg pending  -Ordered DuoNebs, Mucinex as needed  -Urine antigens pending, blood cultures pending    #Type 2 diabetes  -Will hold home metformin  -Ordered sliding scale insulin #2, continue to monitor may require scheduled Lantus    Chronic medical conditions:  #Hypertension  #GERD  #History of hernia  #HFrEF  -Continue home  medications: Aspirin, atorvastatin, carvedilol, MiraLAX, Protonix  -Hold amlodipine and Aldactone until blood pressure improves    DVT PPX: Lovenox  Diet: Diabetic  IVF: None  Code Status: DNR, DNI    This is a preliminary note written by the resident. Please wait for attending addendum for finalization of note and recommendations.    Bimal Carranza DO, PhD  Internal Medicine PGY1

## 2024-05-14 NOTE — ED PROVIDER NOTES
HPI   Chief Complaint   Patient presents with    Shortness of Breath       Patient presents with shortness of breath started few hours prior to arrival.  He has history of schizophrenia and resides in a group home.  History and exam is limited secondary to patient's acuity.  Review of his chart shows a history of hypertension and polysubstance abuse and tobacco abuse.                          [place] Coma Scale Score: 15                     Patient History   No past medical history on file.  No past surgical history on file.  No family history on file.  Social History     Tobacco Use    Smoking status: Not on file    Smokeless tobacco: Not on file   Substance Use Topics    Alcohol use: Not on file    Drug use: Not on file       Physical Exam   ED Triage Vitals   Temperature Heart Rate Respirations BP   05/14/24 0055 05/14/24 0055 05/14/24 0055 05/14/24 0105   35.6 °C (96.1 °F) (!) 113 (!) 30 (!) 188/96      Pulse Ox Temp src Heart Rate Source Patient Position   05/14/24 0055 -- 05/14/24 0115 --   (!) 71 %  Monitor       BP Location FiO2 (%)     -- 05/14/24 0105      50 %       Physical Exam  Vitals and nursing note reviewed.   Constitutional:       General: He is not in acute distress.     Appearance: He is well-developed.   HENT:      Head: Normocephalic and atraumatic.   Eyes:      Conjunctiva/sclera: Conjunctivae normal.   Cardiovascular:      Rate and Rhythm: Regular rhythm. Tachycardia present.      Heart sounds: No murmur heard.  Pulmonary:      Effort: Pulmonary effort is normal. No respiratory distress.      Breath sounds: Wheezing, rhonchi and rales present.   Abdominal:      Palpations: Abdomen is soft.      Tenderness: There is no abdominal tenderness.   Musculoskeletal:         General: No swelling.      Cervical back: Neck supple.   Skin:     General: Skin is warm and dry.      Capillary Refill: Capillary refill takes less than 2 seconds.   Neurological:      Mental Status: He is alert.   Psychiatric:          Mood and Affect: Mood normal.         ED Course & MDM   Diagnoses as of 05/18/24 2121   COPD exacerbation (Multi)   Pneumonia of right lower lobe due to infectious organism   Acute respiratory failure with hypoxia and hypercapnia (Multi)       Medical Decision Making  Twelve-lead EKG is normal sinus rhythm with rate of 91.  Occasional PVC.  No ischemia or injury pattern.  Is interpreted by emergency physician.    Differential diagnosis CHF exacerbation, pneumonia, COPD exacerbation, etc. EMS given the patient 2 DuoNeb's and Solu-Medrol aerosol.  Patient's O2 sat on a nonrebreather mask was 65.  He was placed on BiPAP and given an additional DuoNeb.  On his chest x-ray he has a right lower lobe infiltrate.  He was given Rocephin 2 g IV as well as azithromycin 5 mg IV.  Patient's lactate is 3.1.  Chest x-ray shows a right lower lobe pneumonia.  Given a liter normal saline.  Patient need to be admitted.  Initial VBG shows pH 7.18 with respiratory acidosis.  Repeat VBG is 7.28 with a significant improvement in pCO2.  Patient is much more alert.  I believe patient stable for admission to stepdown.  Discussed case with admitting physician.  Prior medical records were reviewed.        Procedure  Critical Care    Performed by: Gregor Burdick MD  Authorized by: Gregor Burdick MD    Critical care provider statement:     Critical care time (minutes):  34    Critical care time was exclusive of:  Separately billable procedures and treating other patients    Critical care was necessary to treat or prevent imminent or life-threatening deterioration of the following conditions:  Respiratory failure    Critical care was time spent personally by me on the following activities:  Ordering and performing treatments and interventions, ordering and review of laboratory studies, pulse oximetry, review of old charts, re-evaluation of patient's condition and ordering and review of radiographic studies       Gregor Burdick,  MD  05/14/24 0302       Gregor Burdick MD  05/18/24 7470

## 2024-05-14 NOTE — PROGRESS NOTES
Pharmacy Medication History Review    Cade Rader is a 64 y.o. male admitted for COPD exacerbation (Multi). Pharmacy reviewed the patient's [id number]-famuxrlkm medications and allergies for accuracy.    The list below reflectives the updated PTA list. Please review each medication in order reconciliation for additional clarification and justification.  Prior to Admission medications    Medication Sig Start Date End Date Taking? Authorizing Provider   ammonium lactate (Lac-Hydrin) 12 % lotion Apply 1 Application topically 2 times a day as needed.   Yes Historical Provider, MD   atorvastatin (Lipitor) 40 mg tablet Take 1 tablet (40 mg) by mouth once daily.   Yes Historical Provider, MD   carvedilol (Coreg) 25 mg tablet Take 1 tablet (25 mg) by mouth 2 times a day.   Yes Historical Provider, MD   cetirizine (ZyrTEC) 10 mg tablet Take 1 tablet (10 mg) by mouth once daily.   Yes Historical Provider, MD   cholecalciferol (Vitamin D-3) 50,000 unit capsule Take 1 capsule (50,000 Units) by mouth 1 (one) time per week.   Yes Historical Provider, MD   cyanocobalamin (Vitamin B-12) 1,000 mcg tablet Take 1 tablet (1,000 mcg) by mouth once daily.   Yes Historical Provider, MD   docusate sodium (Colace) 100 mg capsule Take 1 capsule (100 mg) by mouth 2 times a day.   Yes Historical Provider, MD   ferrous sulfate, 325 mg ferrous sulfate, tablet Take 1 tablet by mouth every other day.   Yes Historical Provider, MD   hydrocortisone 2.5 % lotion Apply 1 Application topically 2 times a day as needed.   Yes Historical Provider, MD   amLODIPine (Norvasc) 10 mg tablet Take 1 tablet (10 mg) by mouth once daily.    Historical Provider, MD   aspirin 81 mg EC tablet Take 1 tablet (81 mg) by mouth once daily.    Historical Provider, MD   benztropine (Cogentin) 0.5 mg tablet Take 1 tablet (0.5 mg) by mouth 2 times a day.    Historical Provider, MD   fluticasone (Flonase) 50 mcg/actuation nasal spray Administer 1 spray into each nostril once  daily as needed.    Historical Provider, MD   metFORMIN (Glucophage) 500 mg tablet Take 1 tablet (500 mg) by mouth 2 times a day.    Historical Provider, MD   nystatin (Mycostatin) 100,000 unit/mL suspension SWISH AND SWALLOW 5 MLS FOUR TIMES A DAY  Patient taking differently: Take 5 mL (500,000 Units) by mouth 4 times a day as needed. 11/11/23   Ramakrishna Cornejo MD   omeprazole (PriLOSEC) 40 mg DR capsule TAKE 1 CAPSULE BY MOUTH DAILY  Patient taking differently: Take 1 capsule (40 mg) by mouth once daily as needed. 3/21/24   Ramakrishna Cornejo MD   polyethylene glycol (Glycolax, Miralax) 17 gram/dose powder MIX AND DISSOLVE 17 GRAMS IN 8 OUNCES OF WATER AND DRINK TWICE DAILY  Patient taking differently: Take 17 g by mouth 2 times a day as needed. 4/18/24   Ramakrishna Cornejo MD   spironolactone (Aldactone) 25 mg tablet Take 1 tablet (25 mg) by mouth 2 times a day.    Historical Provider, MD   triamcinolone (Kenalog) 0.1 % ointment Apply 1 Application topically 2 times a day as needed.    Historical Provider, MD        The list below reflectives the updated allergy list. Please review each documented allergy for additional clarification and justification.  Allergies  Reviewed by Kayla Zamora RN on 5/14/2024        Severity Reactions Comments    Lisinopril High Anaphylaxis Intubated/hospitalized at  on 4/13/22            Below are additional concerns with the patient's PTA list.      Pura Pascal

## 2024-05-14 NOTE — PROGRESS NOTES
05/14/24 6734   Discharge Planning   Living Arrangements Other (Comment)   Support Systems Family members   Type of Residence Group home   Do you have animals or pets at home? No   Who is requesting discharge planning? Provider   Home or Post Acute Services In home services   Type of Home Care Services Home nursing visits   Patient expects to be discharged to: Group home   Does the patient need discharge transport arranged? No   Financial Resource Strain   How hard is it for you to pay for the very basics like food, housing, medical care, and heating? Not hard   Housing Stability   In the last 12 months, was there a time when you were not able to pay the mortgage or rent on time? N   Transportation Needs   In the past 12 months, has lack of transportation kept you from medical appointments or from getting medications? no   In the past 12 months, has lack of transportation kept you from meetings, work, or from getting things needed for daily living? No     1:30 Pm 5/14/24- [facility] supervisor at bedside to introduce myself and my role. Pt states he is from group home and Patrick is his emergency contact there. He states he plans to go back and  is open to SNF if needed. He has no DME needs. Care transitions to continue to follow while inpt.    PINEDA SCHWARTZ

## 2024-05-14 NOTE — ED TRIAGE NOTES
Pt presents to ED from group home via EMS with c/o sudden onset shortness of breath x 30 minutes. Pt received 2 duonebs pta and 125mg of soluemedrol with no relief. Pt arrives hypoxic on duoneb, Dr. Burdick at bedside.

## 2024-05-15 ENCOUNTER — APPOINTMENT (OUTPATIENT)
Dept: CARDIOLOGY | Facility: HOSPITAL | Age: 64
DRG: 193 | End: 2024-05-15
Payer: COMMERCIAL

## 2024-05-15 PROBLEM — J18.9 PNEUMONIA OF RIGHT LOWER LOBE DUE TO INFECTIOUS ORGANISM: Status: ACTIVE | Noted: 2024-05-15

## 2024-05-15 PROBLEM — J96.01 ACUTE RESPIRATORY FAILURE WITH HYPOXIA AND HYPERCAPNIA (MULTI): Status: ACTIVE | Noted: 2024-05-15

## 2024-05-15 PROBLEM — J96.02 ACUTE RESPIRATORY FAILURE WITH HYPOXIA AND HYPERCAPNIA (MULTI): Status: ACTIVE | Noted: 2024-05-15

## 2024-05-15 LAB
ANION GAP SERPL CALC-SCNC: 10 MMOL/L (ref 10–20)
AORTIC VALVE PEAK VELOCITY: 1.27 M/S
AV PEAK GRADIENT: 6.5 MMHG
AVA (PEAK VEL): 1.27 CM2
BUN SERPL-MCNC: 12 MG/DL (ref 6–23)
CALCIUM SERPL-MCNC: 9.8 MG/DL (ref 8.6–10.3)
CHLORIDE SERPL-SCNC: 99 MMOL/L (ref 98–107)
CO2 SERPL-SCNC: 28 MMOL/L (ref 21–32)
CREAT SERPL-MCNC: 0.75 MG/DL (ref 0.5–1.3)
EGFRCR SERPLBLD CKD-EPI 2021: >90 ML/MIN/1.73M*2
EJECTION FRACTION APICAL 4 CHAMBER: 13.9
ERYTHROCYTE [DISTWIDTH] IN BLOOD BY AUTOMATED COUNT: 15.8 % (ref 11.5–14.5)
GLUCOSE BLD MANUAL STRIP-MCNC: 121 MG/DL (ref 74–99)
GLUCOSE BLD MANUAL STRIP-MCNC: 125 MG/DL (ref 74–99)
GLUCOSE BLD MANUAL STRIP-MCNC: 174 MG/DL (ref 74–99)
GLUCOSE BLD MANUAL STRIP-MCNC: 189 MG/DL (ref 74–99)
GLUCOSE SERPL-MCNC: 114 MG/DL (ref 74–99)
HCT VFR BLD AUTO: 35.5 % (ref 41–52)
HGB BLD-MCNC: 11.3 G/DL (ref 13.5–17.5)
HOLD SPECIMEN: NORMAL
HOLD SPECIMEN: NORMAL
LEFT ATRIUM VOLUME AREA LENGTH INDEX BSA: 34.9 ML/M2
LEFT VENTRICLE INTERNAL DIMENSION DIASTOLE: 5.9 CM (ref 3.5–6)
LEFT VENTRICULAR OUTFLOW TRACT DIAMETER: 1.9 CM
LEGIONELLA AG UR QL: NEGATIVE
LV EJECTION FRACTION BIPLANE: 10 %
M PNEUMO IGM SER IA-ACNC: 0.23 U/L
MCH RBC QN AUTO: 30.8 PG (ref 26–34)
MCHC RBC AUTO-ENTMCNC: 31.8 G/DL (ref 32–36)
MCV RBC AUTO: 97 FL (ref 80–100)
NRBC BLD-RTO: 0 /100 WBCS (ref 0–0)
PLATELET # BLD AUTO: 220 X10*3/UL (ref 150–450)
POTASSIUM SERPL-SCNC: 4 MMOL/L (ref 3.5–5.3)
RBC # BLD AUTO: 3.67 X10*6/UL (ref 4.5–5.9)
RIGHT VENTRICLE FREE WALL PEAK S': 9.68 CM/S
RIGHT VENTRICLE PEAK SYSTOLIC PRESSURE: 51.4 MMHG
S PNEUM AG UR QL: NEGATIVE
SODIUM SERPL-SCNC: 133 MMOL/L (ref 136–145)
TRICUSPID ANNULAR PLANE SYSTOLIC EXCURSION: 2.3 CM
WBC # BLD AUTO: 11.2 X10*3/UL (ref 4.4–11.3)

## 2024-05-15 PROCEDURE — 85027 COMPLETE CBC AUTOMATED: CPT

## 2024-05-15 PROCEDURE — 2060000001 HC INTERMEDIATE ICU ROOM DAILY

## 2024-05-15 PROCEDURE — 2500000004 HC RX 250 GENERAL PHARMACY W/ HCPCS (ALT 636 FOR OP/ED): Performed by: STUDENT IN AN ORGANIZED HEALTH CARE EDUCATION/TRAINING PROGRAM

## 2024-05-15 PROCEDURE — 2500000001 HC RX 250 WO HCPCS SELF ADMINISTERED DRUGS (ALT 637 FOR MEDICARE OP)

## 2024-05-15 PROCEDURE — 2500000004 HC RX 250 GENERAL PHARMACY W/ HCPCS (ALT 636 FOR OP/ED)

## 2024-05-15 PROCEDURE — 2500000002 HC RX 250 W HCPCS SELF ADMINISTERED DRUGS (ALT 637 FOR MEDICARE OP, ALT 636 FOR OP/ED)

## 2024-05-15 PROCEDURE — C9113 INJ PANTOPRAZOLE SODIUM, VIA: HCPCS

## 2024-05-15 PROCEDURE — 82947 ASSAY GLUCOSE BLOOD QUANT: CPT

## 2024-05-15 PROCEDURE — 2500000005 HC RX 250 GENERAL PHARMACY W/O HCPCS

## 2024-05-15 PROCEDURE — 99233 SBSQ HOSP IP/OBS HIGH 50: CPT | Performed by: STUDENT IN AN ORGANIZED HEALTH CARE EDUCATION/TRAINING PROGRAM

## 2024-05-15 PROCEDURE — 36415 COLL VENOUS BLD VENIPUNCTURE: CPT

## 2024-05-15 PROCEDURE — 82374 ASSAY BLOOD CARBON DIOXIDE: CPT

## 2024-05-15 PROCEDURE — 93306 TTE W/DOPPLER COMPLETE: CPT | Performed by: INTERNAL MEDICINE

## 2024-05-15 PROCEDURE — 94660 CPAP INITIATION&MGMT: CPT

## 2024-05-15 PROCEDURE — 93306 TTE W/DOPPLER COMPLETE: CPT

## 2024-05-15 RX ORDER — IPRATROPIUM BROMIDE AND ALBUTEROL SULFATE 2.5; .5 MG/3ML; MG/3ML
3 SOLUTION RESPIRATORY (INHALATION)
Status: DISCONTINUED | OUTPATIENT
Start: 2024-05-15 | End: 2024-05-15

## 2024-05-15 RX ORDER — SPIRONOLACTONE 25 MG/1
25 TABLET ORAL 2 TIMES DAILY
Status: DISCONTINUED | OUTPATIENT
Start: 2024-05-16 | End: 2024-05-17 | Stop reason: HOSPADM

## 2024-05-15 RX ADMIN — CARVEDILOL 25 MG: 25 TABLET, FILM COATED ORAL at 21:57

## 2024-05-15 RX ADMIN — BENZTROPINE MESYLATE 0.5 MG: 0.5 TABLET ORAL at 09:25

## 2024-05-15 RX ADMIN — BENZTROPINE MESYLATE 0.5 MG: 0.5 TABLET ORAL at 21:58

## 2024-05-15 RX ADMIN — ENOXAPARIN SODIUM 40 MG: 40 INJECTION SUBCUTANEOUS at 09:25

## 2024-05-15 RX ADMIN — FUROSEMIDE 40 MG: 10 INJECTION, SOLUTION INTRAVENOUS at 09:26

## 2024-05-15 RX ADMIN — POLYETHYLENE GLYCOL 3350 17 G: 17 POWDER, FOR SOLUTION ORAL at 09:25

## 2024-05-15 RX ADMIN — PANTOPRAZOLE SODIUM 40 MG: 40 INJECTION, POWDER, FOR SOLUTION INTRAVENOUS at 09:27

## 2024-05-15 RX ADMIN — FUROSEMIDE 40 MG: 10 INJECTION, SOLUTION INTRAVENOUS at 16:11

## 2024-05-15 RX ADMIN — ACETAZOLAMIDE 500 MG: 500 INJECTION, POWDER, LYOPHILIZED, FOR SOLUTION INTRAVENOUS at 09:27

## 2024-05-15 RX ADMIN — ASPIRIN 81 MG: 81 TABLET, COATED ORAL at 09:30

## 2024-05-15 RX ADMIN — CARVEDILOL 25 MG: 25 TABLET, FILM COATED ORAL at 09:26

## 2024-05-15 RX ADMIN — METHYLPREDNISOLONE SODIUM SUCCINATE 40 MG: 40 INJECTION, POWDER, FOR SOLUTION INTRAMUSCULAR; INTRAVENOUS at 11:47

## 2024-05-15 RX ADMIN — INSULIN LISPRO 2 UNITS: 100 INJECTION, SOLUTION INTRAVENOUS; SUBCUTANEOUS at 17:44

## 2024-05-15 RX ADMIN — ATORVASTATIN CALCIUM 40 MG: 40 TABLET, FILM COATED ORAL at 09:25

## 2024-05-15 ASSESSMENT — COGNITIVE AND FUNCTIONAL STATUS - GENERAL
WALKING IN HOSPITAL ROOM: A LITTLE
MOBILITY SCORE: 22
HELP NEEDED FOR BATHING: A LITTLE
CLIMB 3 TO 5 STEPS WITH RAILING: A LITTLE
DAILY ACTIVITIY SCORE: 23

## 2024-05-15 ASSESSMENT — PAIN SCALES - GENERAL
PAINLEVEL_OUTOF10: 0 - NO PAIN
PAINLEVEL_OUTOF10: 0 - NO PAIN

## 2024-05-15 ASSESSMENT — PAIN - FUNCTIONAL ASSESSMENT: PAIN_FUNCTIONAL_ASSESSMENT: 0-10

## 2024-05-15 NOTE — PROGRESS NOTES
Subjective   Cade Rader is a 64 y.o. male who presents with Shortness of Breath.    Patient seen this morning with no acute concerns.  Patient has been off BiPAP since yesterday, currently on 5L NC. Continue to wean off supplemental O2 with goal SPO2 greater than 89%. Echo pending.     Objective   Vitals:    05/15/24 0751   BP: 132/78   Pulse: 94   Resp:    Temp: 36 °C (96.8 °F)   SpO2: 100%      Physical Exam  Physical Exam:  Constitutional: well developed, awake, alert, no acute distress  ENMT: mucous membranes moist, EOMI, conjunctivae clear  Head/Neck: normocephalic, atraumatic; supple, trachea midline  Respiratory/Thorax: Increased work of breathing, bilateral wheezing  Cardiovascular: RRR, no murmur  Gastrointestinal: soft, nondistended, non-tender, bowel sounds appreciated  Extremities: palpable peripheral pulses, no edema or cyanosis  Neurological: AO x3, no focal deficits  Psychological: appropriate mood and behavior  Skin: warm and dry    Assessment/Plan       Cade Rader is a 64-year-old male with a past medical history of GERD, schizophrenia, hypertension, HFrEF EF 45%, T2DM, chronic hyponatremia and polysubstance use disorder who presented to [facility] from his group home with shortness of breath, admitted for COPD exacerbation and pneumonia.    Acute medical conditions:  #COPD exacerbation  #CHF exacerbation  #Acute hypoxic respiratory failure  CXR showed right lower lobe infiltrate  CT chest showed pulmonary edema and interstitial lung disease  -Continue Solu-Medrol 40 mg IV twice daily  -On NC continue to wean as tolerated, SPO2 greater than 89%  -Pro-Oleg 0.06  -Bronchodilators PRN  -Urine antigens negative, blood cultures pending  -Echo ordered, last echo 2021 showed HFrEF 40-45%, lasix and diamox ordered, pt responding well    #Type 2 diabetes  -Will hold home metformin  -Ordered sliding scale insulin #2, continue to monitor may require scheduled Lantus    Chronic medical  conditions:  #Hypertension  #GERD  #History of hernia  #HFrEF  -Continue home medications: Aspirin, atorvastatin, carvedilol, MiraLAX, Protonix  -Hold amlodipine and Aldactone until blood pressure improves    DVT PPX: Lovenox  Diet: Diabetic  IVF: None  Code Status: DNR, DNI    This is a preliminary note written by the resident. Please wait for attending addendum for finalization of note and recommendations.    Bimal Carranza DO, PhD  Internal Medicine PGY1

## 2024-05-15 NOTE — CARE PLAN
Problem: Pain - Adult  Goal: Verbalizes/displays adequate comfort level or baseline comfort level  Outcome: Progressing     Problem: Safety - Adult  Goal: Free from fall injury  Outcome: Progressing     Problem: Chronic Conditions and Co-morbidities  Goal: Patient's chronic conditions and co-morbidity symptoms are monitored and maintained or improved  Outcome: Progressing     Problem: Fall/Injury  Goal: Be free from injury by end of the shift  Outcome: Progressing     Problem: Respiratory  Goal: No signs of respiratory distress (eg. Use of accessory muscles. Peds grunting)  Outcome: Progressing  Goal: Verbalize decreased shortness of breath this shift  Outcome: Progressing  Goal: Wean oxygen to maintain O2 saturation per order/standard this shift  Outcome: Progressing     The clinical goals for the shift include patient will verbalize improvement/decrease in shortness of breathe; will maintainsats > 92%

## 2024-05-15 NOTE — PROGRESS NOTES
05/15/24 1513   Discharge Planning   Patient expects to be discharged to: [facility] called and spoke with Jaden from [facility] ([phone]). Per Jaden, pt is able to return to group [place] at discharge. Jaden provided [facility] as current  ([phone]). [facility] updated contact information as prior group home manger told SW that he no longer works there when [facility] called and spoke with him. [facility] states pt will likely need a ride home at discharge.

## 2024-05-16 LAB
ANION GAP SERPL CALC-SCNC: 12 MMOL/L (ref 10–20)
BUN SERPL-MCNC: 20 MG/DL (ref 6–23)
CALCIUM SERPL-MCNC: 10.1 MG/DL (ref 8.6–10.3)
CHLORIDE SERPL-SCNC: 99 MMOL/L (ref 98–107)
CO2 SERPL-SCNC: 25 MMOL/L (ref 21–32)
CREAT SERPL-MCNC: 0.76 MG/DL (ref 0.5–1.3)
EGFRCR SERPLBLD CKD-EPI 2021: >90 ML/MIN/1.73M*2
ERYTHROCYTE [DISTWIDTH] IN BLOOD BY AUTOMATED COUNT: 15.1 % (ref 11.5–14.5)
GLUCOSE BLD MANUAL STRIP-MCNC: 125 MG/DL (ref 74–99)
GLUCOSE BLD MANUAL STRIP-MCNC: 126 MG/DL (ref 74–99)
GLUCOSE BLD MANUAL STRIP-MCNC: 157 MG/DL (ref 74–99)
GLUCOSE BLD MANUAL STRIP-MCNC: 196 MG/DL (ref 74–99)
GLUCOSE SERPL-MCNC: 120 MG/DL (ref 74–99)
HCT VFR BLD AUTO: 37.1 % (ref 41–52)
HGB BLD-MCNC: 12.2 G/DL (ref 13.5–17.5)
MAGNESIUM SERPL-MCNC: 2.29 MG/DL (ref 1.6–2.4)
MCH RBC QN AUTO: 30.9 PG (ref 26–34)
MCHC RBC AUTO-ENTMCNC: 32.9 G/DL (ref 32–36)
MCV RBC AUTO: 94 FL (ref 80–100)
NRBC BLD-RTO: 0 /100 WBCS (ref 0–0)
PLATELET # BLD AUTO: 233 X10*3/UL (ref 150–450)
POTASSIUM SERPL-SCNC: 4 MMOL/L (ref 3.5–5.3)
RBC # BLD AUTO: 3.95 X10*6/UL (ref 4.5–5.9)
SODIUM SERPL-SCNC: 132 MMOL/L (ref 136–145)
STAPHYLOCOCCUS SPEC CULT: NORMAL
WBC # BLD AUTO: 11 X10*3/UL (ref 4.4–11.3)

## 2024-05-16 PROCEDURE — 2500000004 HC RX 250 GENERAL PHARMACY W/ HCPCS (ALT 636 FOR OP/ED)

## 2024-05-16 PROCEDURE — 83735 ASSAY OF MAGNESIUM: CPT

## 2024-05-16 PROCEDURE — 36415 COLL VENOUS BLD VENIPUNCTURE: CPT

## 2024-05-16 PROCEDURE — 99233 SBSQ HOSP IP/OBS HIGH 50: CPT | Performed by: STUDENT IN AN ORGANIZED HEALTH CARE EDUCATION/TRAINING PROGRAM

## 2024-05-16 PROCEDURE — 93010 ELECTROCARDIOGRAM REPORT: CPT | Performed by: INTERNAL MEDICINE

## 2024-05-16 PROCEDURE — 2500000001 HC RX 250 WO HCPCS SELF ADMINISTERED DRUGS (ALT 637 FOR MEDICARE OP)

## 2024-05-16 PROCEDURE — 80048 BASIC METABOLIC PNL TOTAL CA: CPT

## 2024-05-16 PROCEDURE — 99406 BEHAV CHNG SMOKING 3-10 MIN: CPT

## 2024-05-16 PROCEDURE — 85027 COMPLETE CBC AUTOMATED: CPT

## 2024-05-16 PROCEDURE — 99223 1ST HOSP IP/OBS HIGH 75: CPT

## 2024-05-16 PROCEDURE — 2060000001 HC INTERMEDIATE ICU ROOM DAILY

## 2024-05-16 PROCEDURE — C9113 INJ PANTOPRAZOLE SODIUM, VIA: HCPCS

## 2024-05-16 PROCEDURE — 2500000006 HC RX 250 W HCPCS SELF ADMINISTERED DRUGS (ALT 637 FOR ALL PAYERS): Mod: MUE | Performed by: STUDENT IN AN ORGANIZED HEALTH CARE EDUCATION/TRAINING PROGRAM

## 2024-05-16 PROCEDURE — 2500000004 HC RX 250 GENERAL PHARMACY W/ HCPCS (ALT 636 FOR OP/ED): Performed by: STUDENT IN AN ORGANIZED HEALTH CARE EDUCATION/TRAINING PROGRAM

## 2024-05-16 PROCEDURE — 82947 ASSAY GLUCOSE BLOOD QUANT: CPT

## 2024-05-16 RX ORDER — ACETAZOLAMIDE 500 MG/5ML
500 INJECTION, POWDER, LYOPHILIZED, FOR SOLUTION INTRAVENOUS ONCE
Status: COMPLETED | OUTPATIENT
Start: 2024-05-16 | End: 2024-05-16

## 2024-05-16 RX ORDER — FUROSEMIDE 10 MG/ML
20 INJECTION INTRAMUSCULAR; INTRAVENOUS ONCE
Status: COMPLETED | OUTPATIENT
Start: 2024-05-16 | End: 2024-05-16

## 2024-05-16 RX ORDER — FUROSEMIDE 10 MG/ML
40 INJECTION INTRAMUSCULAR; INTRAVENOUS ONCE
Status: COMPLETED | OUTPATIENT
Start: 2024-05-16 | End: 2024-05-16

## 2024-05-16 RX ADMIN — POLYETHYLENE GLYCOL 3350 17 G: 17 POWDER, FOR SOLUTION ORAL at 09:04

## 2024-05-16 RX ADMIN — BENZTROPINE MESYLATE 0.5 MG: 0.5 TABLET ORAL at 09:04

## 2024-05-16 RX ADMIN — EMPAGLIFLOZIN 10 MG: 10 TABLET, FILM COATED ORAL at 13:52

## 2024-05-16 RX ADMIN — CARVEDILOL 25 MG: 25 TABLET, FILM COATED ORAL at 20:34

## 2024-05-16 RX ADMIN — INSULIN LISPRO 2 UNITS: 100 INJECTION, SOLUTION INTRAVENOUS; SUBCUTANEOUS at 17:33

## 2024-05-16 RX ADMIN — SPIRONOLACTONE 25 MG: 25 TABLET, FILM COATED ORAL at 20:34

## 2024-05-16 RX ADMIN — ACETAZOLAMIDE 500 MG: 500 INJECTION, POWDER, LYOPHILIZED, FOR SOLUTION INTRAVENOUS at 09:04

## 2024-05-16 RX ADMIN — BENZTROPINE MESYLATE 0.5 MG: 0.5 TABLET ORAL at 20:34

## 2024-05-16 RX ADMIN — FUROSEMIDE 40 MG: 10 INJECTION, SOLUTION INTRAMUSCULAR; INTRAVENOUS at 09:04

## 2024-05-16 RX ADMIN — INSULIN LISPRO 2 UNITS: 100 INJECTION, SOLUTION INTRAVENOUS; SUBCUTANEOUS at 13:50

## 2024-05-16 RX ADMIN — PANTOPRAZOLE SODIUM 40 MG: 40 INJECTION, POWDER, FOR SOLUTION INTRAVENOUS at 09:04

## 2024-05-16 RX ADMIN — SPIRONOLACTONE 25 MG: 25 TABLET, FILM COATED ORAL at 09:04

## 2024-05-16 RX ADMIN — FUROSEMIDE 20 MG: 10 INJECTION, SOLUTION INTRAMUSCULAR; INTRAVENOUS at 13:50

## 2024-05-16 RX ADMIN — ASPIRIN 81 MG: 81 TABLET, COATED ORAL at 09:04

## 2024-05-16 RX ADMIN — ATORVASTATIN CALCIUM 40 MG: 40 TABLET, FILM COATED ORAL at 09:04

## 2024-05-16 RX ADMIN — ENOXAPARIN SODIUM 40 MG: 40 INJECTION SUBCUTANEOUS at 09:04

## 2024-05-16 RX ADMIN — CARVEDILOL 25 MG: 25 TABLET, FILM COATED ORAL at 09:04

## 2024-05-16 ASSESSMENT — COGNITIVE AND FUNCTIONAL STATUS - GENERAL
DAILY ACTIVITIY SCORE: 23
WALKING IN HOSPITAL ROOM: A LITTLE
HELP NEEDED FOR BATHING: A LITTLE
MOBILITY SCORE: 22
CLIMB 3 TO 5 STEPS WITH RAILING: A LITTLE

## 2024-05-16 ASSESSMENT — PAIN SCALES - GENERAL
PAINLEVEL_OUTOF10: 0 - NO PAIN
PAINLEVEL_OUTOF10: 0 - NO PAIN

## 2024-05-16 ASSESSMENT — PAIN - FUNCTIONAL ASSESSMENT
PAIN_FUNCTIONAL_ASSESSMENT: 0-10
PAIN_FUNCTIONAL_ASSESSMENT: 0-10

## 2024-05-16 NOTE — CARE PLAN
Problem: Pain - Adult  Goal: Verbalizes/displays adequate comfort level or baseline comfort level  Outcome: Progressing     Problem: Safety - Adult  Goal: Free from fall injury  Outcome: Progressing     Problem: Chronic Conditions and Co-morbidities  Goal: Patient's chronic conditions and co-morbidity symptoms are monitored and maintained or improved  Outcome: Progressing     Problem: Fall/Injury  Goal: Be free from injury by end of the shift  Outcome: Progressing     Problem: Respiratory  Goal: No signs of respiratory distress (eg. Use of accessory muscles. Peds grunting)  Outcome: Progressing  Goal: Verbalize decreased shortness of breath this shift  Outcome: Progressing  Goal: Wean oxygen to maintain O2 saturation per order/standard this shift  Outcome: Progressing     The clinical goals for the shift include patient will verbalize continued improvement in breathing; maintain sats > 92%

## 2024-05-16 NOTE — CONSULTS
Consults    Reason For Consult  Decreased EF    History Of Present Illness  Cade Rader is a 64 y.o. male with history of GERD, dysphagia, schizophrenia, hypertension, type 2 diabetes, chronic hyponatremia presented complaining of worsening shortness of breath, cough, sputum production and chest pressure.  Patient stated that over the past couple of days he has had a worsening cough and chest pressure.  States that he thinks it is because he has been smoking cigarettes explained that he has been smoking cigarettes for many years now.  Initial evaluation in the emergency department revealed a troponin of 55, 49.  BNP was 452.  A repeat echo was ordered and showed an EF of 15 to 20% versus markedly declined from his previous echo.     Past Medical History  As mentioned above    Surgical History  Denies any surgeries     Social History  Patient reports that he smokes 1 pack/cigarettes daily, denies alcohol, denies drug    Family History  No family history on file.     Allergies  Lisinopril    Review of Systems  A review of systems was performed and was negative unless mentioned     Physical Exam  Constitutional: well developed, awake, alert, no acute distress  ENMT: mucous membranes moist, conjunctivae clear  Head/Neck: normocephalic, atraumatic; supple, trachea midline  Respiratory/Thorax: patent airways, CTAB; no wheezes, rales, or rhonchi  Cardiovascular: RRR, no murmur  Gastrointestinal: soft, nondistended, non-tender, bowel sounds appreciated  Extremities: palpable peripheral pulses, no edema or cyanosis  Neurological: AO x3, no focal deficits  Psychological: appropriate mood and behavior  Skin: warm and dry    Last Recorded Vitals  BP (!) 124/106 (BP Location: Left arm, Patient Position: Lying)   Pulse 83   Temp 35.8 °C (96.4 °F) (Temporal)   Resp 18   Wt 83.9 kg (185 lb)   SpO2 96%      Assessment/Plan   Cade Rader is a 64 y.o. male with history of GERD, dysphagia, schizophrenia, hypertension, type 2  diabetes, chronic hyponatremia presented complaining of worsening shortness of breath, cough, sputum production and chest pressure.      #HFrEF  #History of hypertension  -Patient's echocardiogram showed an LVEF of 15 to 20%, restrictive pattern of left ventricular diastolic failure, moderate concentric left ventricular hypertrophy and mild to moderate injection of the left and right atrium  -Patient will likely need to be started on GDMT, he is already on a beta-blocker and Aldactone.  I believe that the patient would benefit from an ARNI and an SGLT2.  Will hold off on starting the patient on it already at this time due to an allergy to lisinopril.  Will start patient on SGLT2.  Will continue to diurese this patient.  Plan for a stress test inpatient.    Bertrand La, DO PGY-1  Internal Medicine

## 2024-05-16 NOTE — PROGRESS NOTES
Subjective   Cade Rader is a 64 y.o. male who presents with Shortness of Breath.    Patient seen this morning with no acute concerns.  Patient currently on room air.  Patient states that approximately 1 month ago he experienced some chest discomfort and diaphoresis.  He states that he did not seek medical attention at that time.  Discussed with him how his echo showed reduced EF 15%.  Will await recommendations from cardiology.    Objective   Vitals:    05/16/24 1200   BP: (!) 124/106   Pulse: 83   Resp: 18   Temp: 35.8 °C (96.4 °F)   SpO2: 96%      Physical Exam  Physical Exam:  Constitutional: well developed, awake, alert, no acute distress  ENMT: mucous membranes moist, EOMI, conjunctivae clear  Head/Neck: normocephalic, atraumatic; supple, trachea midline  Respiratory/Thorax: Increased work of breathing, bilateral wheezing  Cardiovascular: RRR, no murmur  Gastrointestinal: soft, nondistended, non-tender, bowel sounds appreciated  Extremities: palpable peripheral pulses, no edema or cyanosis  Neurological: AO x3, no focal deficits  Psychological: appropriate mood and behavior  Skin: warm and dry    Assessment/Plan       Cade Rader is a 64-year-old male with a past medical history of GERD, schizophrenia, hypertension, HFrEF EF 45%, T2DM, chronic hyponatremia and polysubstance use disorder who presented to [facility] from his group home with shortness of breath, admitted for COPD exacerbation and pneumonia.    Acute medical conditions:  #COPD exacerbation  #CHF exacerbation  #Acute hypoxic respiratory failure  CXR showed right lower lobe infiltrate  CT chest showed pulmonary edema and interstitial lung disease  -Continue Solu-Medrol 40 mg IV twice daily  -On NC continue to wean as tolerated, SPO2 greater than 89%  -Pro-Oleg 0.06  -Bronchodilators PRN  -Urine antigens negative, blood cultures pending  -Echo 2021 showed HFrEF 40-45%  -Repeat echo during this admission showed reduced EF 15 to 20%  -Cardiology consulted  for concern as to whether patient may require heart catheterization, as well as GDMT    #Type 2 diabetes  -Will hold home metformin  -Ordered sliding scale insulin #2    Chronic medical conditions:  #Hypertension  #GERD  #History of hernia  #HFrEF  -Continue home medications: Aspirin, atorvastatin, carvedilol, MiraLAX, Protonix  -Hold amlodipine and Aldactone until blood pressure improves    DVT PPX: Lovenox  Diet: Diabetic  IVF: None  Code Status: DNR, DNI    This is a preliminary note written by the resident. Please wait for attending addendum for finalization of note and recommendations.    Bimal Carranza DO, PhD  Internal Medicine PGY1

## 2024-05-17 ENCOUNTER — APPOINTMENT (OUTPATIENT)
Dept: CARDIOLOGY | Facility: HOSPITAL | Age: 64
DRG: 193 | End: 2024-05-17
Payer: COMMERCIAL

## 2024-05-17 ENCOUNTER — APPOINTMENT (OUTPATIENT)
Dept: RADIOLOGY | Facility: HOSPITAL | Age: 64
DRG: 193 | End: 2024-05-17
Payer: COMMERCIAL

## 2024-05-17 VITALS
SYSTOLIC BLOOD PRESSURE: 136 MMHG | RESPIRATION RATE: 16 BRPM | DIASTOLIC BLOOD PRESSURE: 79 MMHG | HEIGHT: 68 IN | BODY MASS INDEX: 28.04 KG/M2 | HEART RATE: 60 BPM | WEIGHT: 185 LBS | TEMPERATURE: 97.3 F | OXYGEN SATURATION: 96 %

## 2024-05-17 PROBLEM — I50.23 ACUTE ON CHRONIC SYSTOLIC CONGESTIVE HEART FAILURE (MULTI): Status: ACTIVE | Noted: 2024-05-17

## 2024-05-17 LAB
ANION GAP SERPL CALC-SCNC: 13 MMOL/L (ref 10–20)
BUN SERPL-MCNC: 23 MG/DL (ref 6–23)
CALCIUM SERPL-MCNC: 9.7 MG/DL (ref 8.6–10.3)
CHLORIDE SERPL-SCNC: 100 MMOL/L (ref 98–107)
CO2 SERPL-SCNC: 23 MMOL/L (ref 21–32)
CREAT SERPL-MCNC: 0.91 MG/DL (ref 0.5–1.3)
EGFRCR SERPLBLD CKD-EPI 2021: >90 ML/MIN/1.73M*2
ERYTHROCYTE [DISTWIDTH] IN BLOOD BY AUTOMATED COUNT: 15.1 % (ref 11.5–14.5)
GLUCOSE BLD MANUAL STRIP-MCNC: 120 MG/DL (ref 74–99)
GLUCOSE BLD MANUAL STRIP-MCNC: 131 MG/DL (ref 74–99)
GLUCOSE BLD MANUAL STRIP-MCNC: 139 MG/DL (ref 74–99)
GLUCOSE SERPL-MCNC: 117 MG/DL (ref 74–99)
HCT VFR BLD AUTO: 40.1 % (ref 41–52)
HGB BLD-MCNC: 13.2 G/DL (ref 13.5–17.5)
MAGNESIUM SERPL-MCNC: 2.21 MG/DL (ref 1.6–2.4)
MCH RBC QN AUTO: 31.1 PG (ref 26–34)
MCHC RBC AUTO-ENTMCNC: 32.9 G/DL (ref 32–36)
MCV RBC AUTO: 95 FL (ref 80–100)
NRBC BLD-RTO: 0 /100 WBCS (ref 0–0)
PLATELET # BLD AUTO: 218 X10*3/UL (ref 150–450)
POTASSIUM SERPL-SCNC: 4.1 MMOL/L (ref 3.5–5.3)
RBC # BLD AUTO: 4.24 X10*6/UL (ref 4.5–5.9)
SODIUM SERPL-SCNC: 132 MMOL/L (ref 136–145)
WBC # BLD AUTO: 6.8 X10*3/UL (ref 4.4–11.3)

## 2024-05-17 PROCEDURE — 83735 ASSAY OF MAGNESIUM: CPT

## 2024-05-17 PROCEDURE — 85027 COMPLETE CBC AUTOMATED: CPT

## 2024-05-17 PROCEDURE — C9113 INJ PANTOPRAZOLE SODIUM, VIA: HCPCS

## 2024-05-17 PROCEDURE — 36415 COLL VENOUS BLD VENIPUNCTURE: CPT

## 2024-05-17 PROCEDURE — 99239 HOSP IP/OBS DSCHRG MGMT >30: CPT | Performed by: STUDENT IN AN ORGANIZED HEALTH CARE EDUCATION/TRAINING PROGRAM

## 2024-05-17 PROCEDURE — 99233 SBSQ HOSP IP/OBS HIGH 50: CPT | Performed by: INTERNAL MEDICINE

## 2024-05-17 PROCEDURE — 2500000004 HC RX 250 GENERAL PHARMACY W/ HCPCS (ALT 636 FOR OP/ED)

## 2024-05-17 PROCEDURE — 2500000001 HC RX 250 WO HCPCS SELF ADMINISTERED DRUGS (ALT 637 FOR MEDICARE OP)

## 2024-05-17 PROCEDURE — 93005 ELECTROCARDIOGRAM TRACING: CPT

## 2024-05-17 PROCEDURE — 82947 ASSAY GLUCOSE BLOOD QUANT: CPT | Mod: 91

## 2024-05-17 PROCEDURE — 80048 BASIC METABOLIC PNL TOTAL CA: CPT

## 2024-05-17 PROCEDURE — 2500000006 HC RX 250 W HCPCS SELF ADMINISTERED DRUGS (ALT 637 FOR ALL PAYERS): Performed by: STUDENT IN AN ORGANIZED HEALTH CARE EDUCATION/TRAINING PROGRAM

## 2024-05-17 RX ADMIN — CARVEDILOL 25 MG: 25 TABLET, FILM COATED ORAL at 09:16

## 2024-05-17 RX ADMIN — EMPAGLIFLOZIN 10 MG: 10 TABLET, FILM COATED ORAL at 09:00

## 2024-05-17 RX ADMIN — POLYETHYLENE GLYCOL 3350 17 G: 17 POWDER, FOR SOLUTION ORAL at 09:16

## 2024-05-17 RX ADMIN — SPIRONOLACTONE 25 MG: 25 TABLET, FILM COATED ORAL at 09:16

## 2024-05-17 RX ADMIN — BENZTROPINE MESYLATE 0.5 MG: 0.5 TABLET ORAL at 09:18

## 2024-05-17 RX ADMIN — ENOXAPARIN SODIUM 40 MG: 40 INJECTION SUBCUTANEOUS at 09:16

## 2024-05-17 RX ADMIN — PANTOPRAZOLE SODIUM 40 MG: 40 INJECTION, POWDER, FOR SOLUTION INTRAVENOUS at 09:16

## 2024-05-17 RX ADMIN — ATORVASTATIN CALCIUM 40 MG: 40 TABLET, FILM COATED ORAL at 09:16

## 2024-05-17 RX ADMIN — ASPIRIN 81 MG: 81 TABLET, COATED ORAL at 09:16

## 2024-05-17 ASSESSMENT — PAIN - FUNCTIONAL ASSESSMENT: PAIN_FUNCTIONAL_ASSESSMENT: 0-10

## 2024-05-17 ASSESSMENT — PAIN SCALES - GENERAL: PAINLEVEL_OUTOF10: 0 - NO PAIN

## 2024-05-17 NOTE — PROGRESS NOTES
Cardiology Progress Note      Cade Rader is a 64 y.o. male on day 3 of admission presenting with COPD exacerbation (Multi).       Subjective   Feeling better.  Denies chest pain or pressure no shortness of breath no coughing.       ROS:  10 systems reviewed other than what is mentioned above.     MEDICATION:    Scheduled medications  aspirin, 81 mg, oral, Daily  atorvastatin, 40 mg, oral, Daily  benztropine, 0.5 mg, oral, BID  carvedilol, 25 mg, oral, BID  empagliflozin, 10 mg, oral, Daily  enoxaparin, 40 mg, subcutaneous, q24h  insulin lispro, 0-10 Units, subcutaneous, TID  pantoprazole, 40 mg, intravenous, Daily  polyethylene glycol, 17 g, oral, Daily  spironolactone, 25 mg, oral, BID      Continuous medications     PRN medications  PRN medications: albuterol, dextrose, dextrose, glucagon, glucagon, guaiFENesin, oxygen     Principal Problem:    COPD exacerbation (Multi)  Active Problems:    Acute respiratory failure with hypoxia and hypercapnia (Multi)    Pneumonia of right lower lobe due to infectious organism      OBJECTIVE:    Visit Vitals  /69   Pulse 84   Temp 35.5 °C (95.9 °F) (Temporal)   Resp 18          Intake/Output Summary (Last 24 hours) at 5/17/2024 0849  Last data filed at 5/17/2024 0302  Gross per 24 hour   Intake --   Output 3475 ml   Net -3475 ml                Patient is alert and oriented x3.  HEENT is unremarkable mucous members are moist  Neck no JVP no bruits upstrokes are full no thyromegaly  Lungs are clear bilaterally.  No wheezing crackles or rales  Heart regular rhythm normal S1-S2 there is no S3 no murmurs are heard.  Abdomen is soft vessels are positive nontender nondistended no organomegaly no pulsatile masses  Extremities have no edema.  Distal pulses present palpable.  Neuro is grossly nonfocal  Skin has no rashes     Image Results  Transthoracic Echo (TTE) Complete      [facility], [address] [facility] [id number] [phone] and                                   Fax [phone]    TRANSTHORACIC ECHOCARDIOGRAM REPORT       Patient Name:      VITA SPRINGER          Cece Physician:    [id number] Shay Calvin MD  Study Date:        5/15/2024            Ordering Provider:    [id number] RENNY ROMERO  MRN/PID:           [id number]             Fellow:  Accession#:        [id number]         Nurse:  Date of Birth/Age: 1960 / 64 years Sonographer:          Kimberly Delong RDCS  Gender:            M                    Additional Staff:  Height:            172.72 cm            Admit Date:           5/14/2024  Weight:            83.92 kg             Admission Status:     Inpatient -                                                                Routine  BSA / BMI:         1.98 m2 / 28.13      Encounter#:           [id number]                     kg/m2                                          Department Location:  [facility]  Blood Pressure: 128 /69 mmHg    Study Type:    TRANSTHORACIC ECHO (TTE) COMPLETE  Diagnosis/ICD: Unspecified systolic (congestive) heart failure (CHF)-I50.20;                 Elevated Troponin-R79.89  Indication:    SOB  CPT Code:      Echo Complete w Full Doppler-98701    Patient History:  Smoker:            Current.  Pertinent History: COPD and HTN. Hx Polysubstance Abuse.    Study Detail: The following Echo studies were performed: 2D, M-Mode, Doppler and                color flow.       PHYSICIAN INTERPRETATION:  Left Ventricle: The left ventricular systolic function is severely decreased, with an estimated ejection fraction of 15-20%. There are no regional wall motion abnormalities. The left ventricular cavity size is upper limits of normal. There is moderate concentric left ventricular hypertrophy. Spectral Doppler shows a restrictive pattern of left ventricular diastolic filling.  Left Atrium: The left atrium is mild to  moderately dilated.  Right Ventricle: The right ventricle is normal in size. There is normal right ventricular global systolic function.  Right Atrium: The right atrium is mild to moderately dilated.  Aortic Valve: The aortic valve is trileaflet. There is no evidence of aortic valve regurgitation. The peak instantaneous gradient of the aortic valve is 6.5 mmHg.  Mitral Valve: The mitral valve is normal in structure. There is mild mitral valve regurgitation.  Tricuspid Valve: The tricuspid valve is structurally normal. No evidence of tricuspid regurgitation. The Doppler estimated RVSP is moderately elevated at 51.4 mmHg.  Pulmonic Valve: The pulmonic valve is structurally normal. There is no indication of pulmonic valve regurgitation.  Pericardium: There is no pericardial effusion noted.  Aorta: The aortic root is normal.       CONCLUSIONS:   1. Left ventricular systolic function is severely decreased with a 15-20% estimated ejection fraction.   2. Spectral Doppler shows a restrictive pattern of left ventricular diastolic filling.   3. There is moderate concentric left ventricular hypertrophy.   4. The left atrium is mild to moderately dilated.   5. The right atrium is mild to moderately dilated.   6. Moderately elevated right ventricular systolic pressure.    QUANTITATIVE DATA SUMMARY:  2D MEASUREMENTS:                            Normal Ranges:  Ao Root d:     2.50 cm    (2.0-3.7cm)  LAs:           4.80 cm    (2.7-4.0cm)  IVSd:          1.64 cm    (0.6-1.1cm)  LVPWd:         1.38 cm    (0.6-1.1cm)  LVIDd:         5.90 cm    (3.9-5.9cm)  LVIDs:         5.44 cm  LV Mass Index: 212.5 g/m2  LV % FS        7.8 %    LA VOLUME:                                Normal Ranges:  LA Vol A4C:        62.7 ml    (22+/-6mL/m2)  LA Vol A2C:        68.1 ml  LA Vol BP:         69.0 ml  LA Vol Index A4C:  31.7ml/m2  LA Vol Index A2C:  34.4 ml/m2  LA Vol Index BP:   34.9 ml/m2  LA Area A4C:       22.4 cm2  LA Area A2C:       22.1 cm2  LA  Major Axis A4C: 6.8 cm  LA Major Axis A2C: 6.1 cm  LA Volume Index:   33.6 ml/m2  LA Vol A4C:        60.2 ml  LA Vol A2C:        66.8 ml    RA VOLUME BY A/L METHOD:                        Normal Ranges:  RA Area A4C: 17.2 cm2    M-MODE MEASUREMENTS:                   Normal Ranges:  Ao Root: 2.50 cm (2.0-3.7cm)  AoV Exc: 5.00 cm (1.5-2.5cm)    AORTA MEASUREMENTS:                     Normal Ranges:  AoV Exc:   5.00 cm (1.5-2.5cm)  Asc Ao, d: 2.50 cm (2.1-3.4cm)    LV SYSTOLIC FUNCTION BY 2D PLANIMETRY (MOD):                      Normal Ranges:  EF-A4C View: 13.9 % (>=55%)  EF-A2C View: 11.8 %  EF-Biplane:  9.5 %    LV DIASTOLIC FUNCTION:                         Normal Ranges:  MV lateral e' 0.08 m/s  MV medial e'  0.07 m/s    MITRAL VALVE:                  Normal Ranges:  MV DT: 161 msec (150-240msec)    MITRAL INSUFFICIENCY:                    Normal Ranges:  dP/dt: 713 mmHg/s (>1200mmHg/sec)    AORTIC VALVE:                          Normal Ranges:  AoV Vmax:      1.27 m/s (<=1.7m/s)  AoV Peak P.5 mmHg (<20mmHg)  LVOT Max Paco:  0.57 m/s (<=1.1m/s)  LVOT VTI:      5.69 cm  LVOT Diameter: 1.90 cm  (1.8-2.4cm)  AoV Area,Vmax: 1.27 cm2 (2.5-4.5cm2)       RIGHT VENTRICLE:  RV Basal 3.34 cm  RV Mid   1.57 cm  RV Major 8.2 cm  TAPSE:   22.7 mm  RV s'    0.10 m/s    TRICUSPID VALVE/RVSP:                              Normal Ranges:  Peak TR Velocity: 3.48 m/s  RV Syst Pressure: 51.4 mmHg (< 30mmHg)       [id number] Shay Calvin MD  Electronically signed on 5/15/2024 at 9:42:38 PM       ** Final **        Relevant Results:  RESULTS:    Lab Results   Component Value Date    WBC 6.8 2024    HGB 13.2 (L) 2024    HCT 40.1 (L) 2024    MCV 95 2024     2024        Lab Results   Component Value Date    CREATININE 0.91 2024    BUN 23 2024     (L) 2024    K 4.1 2024     2024    CO2 23 2024        Results from last 7 days   Lab Units 24  0551  05/15/24  0557 05/14/24  0108   PROTEIN TOTAL g/dL  --   --  7.8   BILIRUBIN TOTAL mg/dL  --   --  0.4   ALK PHOS U/L  --   --  53   ALT U/L  --   --  12   AST U/L  --   --  13   GLUCOSE mg/dL 117*   < > 257*   BNP pg/mL  --   --  452*    < > = values in this interval not displayed.       Assessment/Plan   5/17/2024    1.  HFrEF.  He had a previous decline in LV function in 2021 with ejection fraction 40 to 45%.  Cardiac catheterization at that time revealed minimal nonobstructive plaque.  His EF is now 15%.  Etiology is unclear but much less likely to be ischemic in origin.  Will optimize GDMT.  Unable to use ACE inhibitors because of the treatment of lactic reaction to lisinopril although he is not able to elaborate.  I am concerned about utilizing Entresto.  Continue with carvedilol, Jardiance, and spironolactone.  Will utilize hydralazine and nitrates as an alternative to ACE and ARB agents.  Currently appears to be fairly euvolemic.  I will discontinue IV diuresis to allow more blood pressure room for optimization of GDMT.  Repeat assessment of LV function in 3 months.  If LV function is still severely diminished, consideration to AICD placement for prevention of sudden cardiac death  2.  Hypertension.  Well-controlled  3.  Tobacco abuse.  Discussed the importance of tobacco cessation.  4.  Chronic hyponatremia.  Need to be cautious with any diuretics        Shay Calvin MD

## 2024-05-17 NOTE — CARE PLAN
The patient's goals for the shift include      The clinical goals for the shift include decreased sob      Problem: Pain - Adult  Goal: Verbalizes/displays adequate comfort level or baseline comfort level  Outcome: Progressing     Problem: Safety - Adult  Goal: Free from fall injury  Outcome: Progressing     Problem: Chronic Conditions and Co-morbidities  Goal: Patient's chronic conditions and co-morbidity symptoms are monitored and maintained or improved  Outcome: Progressing     Problem: Fall/Injury  Goal: Be free from injury by end of the shift  Outcome: Progressing     Problem: Respiratory  Goal: No signs of respiratory distress (eg. Use of accessory muscles. Peds grunting)  Outcome: Progressing  Goal: Verbalize decreased shortness of breath this shift  Outcome: Progressing  Goal: Wean oxygen to maintain O2 saturation per order/standard this shift  Outcome: Progressing

## 2024-05-17 NOTE — DISCHARGE INSTRUCTIONS
1.  Start taking Jardiance 10 mg once daily.  2.  Follow-up with cardiology in the next 1 to 2 weeks regarding your decompensated heart failure with reduced EF.  Will require repeat echo in 3 months.  3.  Follow-up with your PCP in the next 1-2 weeks regarding your recent hospitalization.

## 2024-05-17 NOTE — DISCHARGE SUMMARY
Discharge Diagnosis  COPD exacerbation  CHF exacerbation    Issues Requiring Follow-Up  Decompensated CHF exacerbation, EF 15%, GDMT on discharge added Jardiance  Will require repeat echo in 3 months    Discharge Meds     Your medication list        START taking these medications        Instructions Last Dose Given Next Dose Due   empagliflozin 10 mg  Commonly known as: Jardiance  Start taking on: May 18, 2024      Take 1 tablet (10 mg) by mouth once daily.              CHANGE how you take these medications        Instructions Last Dose Given Next Dose Due   nystatin 100,000 unit/mL suspension  Commonly known as: Mycostatin  What changed: See the new instructions.      SWISH AND SWALLOW 5 MLS FOUR TIMES A DAY       polyethylene glycol 17 gram/dose powder  Commonly known as: Glycolax, Miralax  What changed: See the new instructions.      MIX AND DISSOLVE 17 GRAMS IN 8 OUNCES OF WATER AND DRINK TWICE DAILY              CONTINUE taking these medications        Instructions Last Dose Given Next Dose Due   amLODIPine 10 mg tablet  Commonly known as: Norvasc           ammonium lactate 12 % lotion  Commonly known as: Lac-Hydrin           aspirin 81 mg EC tablet           atorvastatin 40 mg tablet  Commonly known as: Lipitor           benztropine 0.5 mg tablet  Commonly known as: Cogentin           carvedilol 25 mg tablet  Commonly known as: Coreg           cetirizine 10 mg tablet  Commonly known as: ZyrTEC           cholecalciferol 50,000 unit capsule  Commonly known as: Vitamin D-3           cyanocobalamin 1,000 mcg tablet  Commonly known as: Vitamin B-12           docusate sodium 100 mg capsule  Commonly known as: Colace           ferrous sulfate (325 mg ferrous sulfate) tablet           fluticasone 50 mcg/actuation nasal spray  Commonly known as: Flonase           hydrocortisone 2.5 % lotion           metFORMIN 500 mg tablet  Commonly known as: Glucophage           omeprazole 40 mg DR capsule  Commonly known as:  PriLOSEC      TAKE 1 CAPSULE BY MOUTH DAILY       spironolactone 25 mg tablet  Commonly known as: Aldactone           triamcinolone 0.1 % ointment  Commonly known as: Kenalog                  STOP taking these medications      cyclobenzaprine 10 mg tablet  Commonly known as: Flexeril                  Where to Get Your Medications        [facility] [place] [facility] [address]  [address] [address]      Phone: [phone]   empagliflozin 10 mg         Test Results Pending At Discharge  Pending Labs       Order Current Status    Blood Culture Preliminary result    Blood Culture Preliminary result            Hospital Course   Cade Rader is a 64 y.o. male with a past medical history of GERD, nicotine use disorder, dysphagia, schizophrenia, hypertension, T2DM, chronic hyponatremia, anaphylaxis in 2022 (unknown etiology) who presents to [place] ED from his group home for shortness of breath and admitted for COPD exacerbation and CHF exacerbation.  In the ED lactate was 3.1, VBG showed pH 7.18, CO2 82.  Patient was placed on BiPAP and then transition to nasal cannula.  WBC 14, , troponin 55 and repeat 49.  Patient was given 1 L bolus and started on IV antibiotics and admitted to the medical floor for further medical management.  CXR showed basilar airspace disease.  CTA chest showed signs of pulmonary congestion.  Patient was diuresed.  Repeat echo showed EF 15 to 20%.  Cardiology was consulted and GDMT was optimized.  Patient will be discharged on Jardiance 10 mg.  Patient will require repeat echo in 3 months.  Patient is to follow-up with cardiology in the next 1 to 2 weeks regarding recent hospitalization as well as discussions for elective ICD.  Patient is to follow-up with PCP in the next 1 to 2 weeks for any recent authorization.  Patient stable for discharge home.    Pertinent Physical Exam At Time of Discharge  Physical  Exam  Constitutional: well developed, awake, alert, no acute distress  ENMT: mucous membranes moist, EOMI, conjunctivae clear  Head/Neck: normocephalic, atraumatic; supple, trachea midline  Respiratory/Thorax: Increased work of breathing, bilateral wheezing  Cardiovascular: RRR, no murmur  Gastrointestinal: soft, nondistended, non-tender, bowel sounds appreciated  Extremities: palpable peripheral pulses, no edema or cyanosis  Neurological: AO x3, no focal deficits  Psychological: appropriate mood and behavior  Skin: warm and dry    Outpatient Follow-Up  No future appointments.      Bimal Carranza DO, PhD  Internal Medicine PGY1

## 2024-05-18 LAB
BACTERIA BLD CULT: NORMAL
BACTERIA BLD CULT: NORMAL

## 2024-05-19 LAB
ATRIAL RATE: 85 BPM
P AXIS: 49 DEGREES
P OFFSET: 200 MS
P ONSET: 134 MS
PR INTERVAL: 152 MS
Q ONSET: 210 MS
QRS COUNT: 14 BEATS
QRS DURATION: 114 MS
QT INTERVAL: 414 MS
QTC CALCULATION(BAZETT): 492 MS
QTC FREDERICIA: 465 MS
R AXIS: 22 DEGREES
T AXIS: 134 DEGREES
T OFFSET: 417 MS
VENTRICULAR RATE: 85 BPM

## 2024-07-15 DIAGNOSIS — B37.0 THRUSH: ICD-10-CM

## 2024-07-15 RX ORDER — NYSTATIN 100000 [USP'U]/ML
SUSPENSION ORAL
Qty: 200 ML | Refills: 10 | Status: SHIPPED | OUTPATIENT
Start: 2024-07-15

## 2024-07-18 DIAGNOSIS — R14.0 ABDOMINAL BLOATING: Primary | ICD-10-CM

## 2024-07-18 RX ORDER — SIMETHICONE 125 MG
TABLET,CHEWABLE ORAL
Qty: 120 TABLET | Refills: 10 | Status: SHIPPED | OUTPATIENT
Start: 2024-07-18

## 2024-11-15 DIAGNOSIS — B37.0 THRUSH: ICD-10-CM

## 2024-11-16 RX ORDER — NYSTATIN 100000 [USP'U]/ML
SUSPENSION ORAL
Qty: 200 ML | Refills: 10 | Status: SHIPPED | OUTPATIENT
Start: 2024-11-16